# Patient Record
Sex: FEMALE | Race: WHITE | NOT HISPANIC OR LATINO | Employment: FULL TIME | URBAN - METROPOLITAN AREA
[De-identification: names, ages, dates, MRNs, and addresses within clinical notes are randomized per-mention and may not be internally consistent; named-entity substitution may affect disease eponyms.]

---

## 2017-01-06 ENCOUNTER — TELEPHONE (OUTPATIENT)
Dept: MEDICAL GROUP | Age: 37
End: 2017-01-06

## 2017-01-06 ENCOUNTER — PATIENT MESSAGE (OUTPATIENT)
Dept: MEDICAL GROUP | Age: 37
End: 2017-01-06

## 2017-01-06 ENCOUNTER — OFFICE VISIT (OUTPATIENT)
Dept: MEDICAL GROUP | Age: 37
End: 2017-01-06
Payer: COMMERCIAL

## 2017-01-06 VITALS
DIASTOLIC BLOOD PRESSURE: 76 MMHG | HEIGHT: 62 IN | OXYGEN SATURATION: 97 % | TEMPERATURE: 98.8 F | BODY MASS INDEX: 26.68 KG/M2 | WEIGHT: 145 LBS | HEART RATE: 95 BPM | SYSTOLIC BLOOD PRESSURE: 124 MMHG

## 2017-01-06 DIAGNOSIS — N63.0 BREAST MASS IN FEMALE: ICD-10-CM

## 2017-01-06 PROCEDURE — 99214 OFFICE O/P EST MOD 30 MIN: CPT | Performed by: FAMILY MEDICINE

## 2017-01-06 NOTE — TELEPHONE ENCOUNTER
FRANCISCA        1. Caller Name: Tiffany Johnson                                           Call Back Number: [phone] (home) [phone] (work) ext [phone]       2. Patient‘s symptoms (location & severity)?  Found lump in left breast    3. Is this a new symptom? yes    4. When did it start? 01/06/2017    5. Action taken per active symptom guide: scheduled appt (Same day scheduled; UC recommended; ED recommended)    6. Patient agrees to recommended action per active symptom guide: yes   scheduled appt .If no, patient was advised again of recommendation and patient verbalized understanding.

## 2017-01-07 PROBLEM — N63.0 BREAST MASS IN FEMALE: Status: ACTIVE | Noted: 2017-01-07

## 2017-01-07 NOTE — PROGRESS NOTES
Subjective:   CC: breast mass    HPI:     Tiffany Milligan is a 36 y.o. Female who present with a lump in her left breast  Family hx significant for mom with metastatic breast cancer, diagnosed at the age of 60 year old, currently going through treatment, BRCA1/2 negative.   Pt states that her left breast becomes mildly pruritic couple days ago. She scratches and finds a firm lump near the left nipple.   Denies fever, chills, nipple bleeding/discharge, lumps/bumps in the axilla or supraclavicular area, unexplained weight loss, breast skin changes, breast erythema or edema.   She, however, states that left nipple appears to be slightly inverted compared to the right nipple.   Pt is , currently not sexually active.     Current medicines (including changes today)  Current Outpatient Prescriptions   Medication Sig Dispense Refill   • ipratropium (ATROVENT) 0.02 % Solution by Nebulization route.     • omeprazole (PRILOSEC) 20 MG delayed-release capsule Take 20 mg by mouth every day.     • albuterol (PROAIR HFA) 108 (90 BASE) MCG/ACT Aero Soln inhalation aerosol Inhale 1-2 Puffs by mouth every 6 hours as needed for Shortness of Breath. 8.5 g 3     No current facility-administered medications for this visit.     She  has no past medical history on file.    I personally reviewed patient's problem list, allergies, medications, family hx, social hx with patient and update EPIC.     REVIEW OF SYSTEMS:  CONSTITUTIONAL:  Denies night sweats, fatigue, malaise, lethargy, fever or chills.  RESPIRATORY:  Denies cough, wheeze, hemoptysis, or shortness of breath.  CARDIOVASCULAR:  Denies chest pains, palpitations, pedal edema  GASTROINTESTINAL:  Denies abdominal pain, nausea or vomiting, diarrhea, constipation, hematemesis, hematochezia, melena.  GENITOURINARY:  Denies urinary urgency, frequency, dysuria, or hematuria.  No obstructive symptoms.  Denies unusual discharge.       Objective:     Blood pressure 124/76, pulse 95,  "temperature 37.1 °C (98.8 °F), height 1.575 m (5' 2.01\"), weight 65.772 kg (145 lb), last menstrual period 12/23/2016, SpO2 97 %, not currently breastfeeding. Body mass index is 26.51 kg/(m^2).    Physical Exam:  Constitutional: awake, alert, in no distress.  Skin: Warm, dry, good turgor, no rashes, bruises, ulcers in visible areas.  Eye: conjunctiva clear, lids neg for edema or lesions.  Neck: Trachea midline, no masses, no thyromegaly. No cervical or supraclavicular lymphadenopathy  Respiratory: Unlabored respiratory effort, lungs clear to auscultation, no wheezes, no ronchi.  Cardiovascular: Normal S1, S2, no murmur, no pedal edema.  Breast exam: breasts are symmetric, neg for erythema, edema, nipple bleeding/discharge.   There is an irregular, firm, mobile, non-tender mass measuring 4x7 cm, found on the left upper/lower outer quadrants approximately 1-2 cm from the left nipple. Left nipple is slightly inverted.   Neuro: CN2-12 grossly intact, no sensory deficits.   Psych: Oriented x3, affect and mood wnl, intact judgement and insight.       Assessment and Plan:   The following treatment plan was discussed    1. Breast mass in female, left  37 yo female with familial hx of metastatic breast cancer in first degree family member (mom) presents with a large left breast mass.   Low suspicion for infection or inflammation process per hx. Benign/malignant neoplasm is of concern. Plan:   - US-BREAST COMPLETE-LEFT; Future  - MA-DIAGNOSTIC MAMMO W/CAD-BILAT; Future  - REFERRAL TO INTAKE ONCOLOGY COORDINATOR      Yuridia Zelaya M.D.      Followup: Return in about 4 weeks (around 2/3/2017).           "

## 2017-01-17 ENCOUNTER — OFFICE VISIT (OUTPATIENT)
Dept: HEMATOLOGY ONCOLOGY | Facility: MEDICAL CENTER | Age: 37
End: 2017-01-17
Payer: COMMERCIAL

## 2017-01-17 VITALS
HEIGHT: 62 IN | SYSTOLIC BLOOD PRESSURE: 122 MMHG | BODY MASS INDEX: 26.5 KG/M2 | TEMPERATURE: 98.1 F | WEIGHT: 144 LBS | DIASTOLIC BLOOD PRESSURE: 84 MMHG | HEART RATE: 85 BPM | OXYGEN SATURATION: 98 % | RESPIRATION RATE: 16 BRPM

## 2017-01-17 DIAGNOSIS — N63.0 BREAST MASS IN FEMALE: ICD-10-CM

## 2017-01-17 PROCEDURE — 99203 OFFICE O/P NEW LOW 30 MIN: CPT | Performed by: NURSE PRACTITIONER

## 2017-01-17 RX ORDER — CETIRIZINE HYDROCHLORIDE 10 MG/1
10 TABLET ORAL DAILY
COMMUNITY

## 2017-01-17 ASSESSMENT — ENCOUNTER SYMPTOMS
NAUSEA: 0
HEARTBURN: 1
DIZZINESS: 0
HEADACHES: 0
MYALGIAS: 0
BACK PAIN: 0
WHEEZING: 0
WEAKNESS: 0
INSOMNIA: 0
NECK PAIN: 0
CONSTIPATION: 0
SHORTNESS OF BREATH: 0
PALPITATIONS: 0
CHILLS: 0
COUGH: 0
FEVER: 0
VOMITING: 0
TINGLING: 0
DIARRHEA: 0
WEIGHT LOSS: 0

## 2017-01-17 NOTE — MR AVS SNAPSHOT
"        Tiffany Milligan   2017 9:30 AM   Office Visit   MRN: [id number]    Department:  [facility]   Dept Phone:  [phone]    Description:  Female : 1980   Provider:  Haely Sweet A.PNateN.           Reason for Visit     New Patient breast mass in female       Allergies as of 2017     Allergen Noted Reactions    Ranitidine 2016;abd pain      Vital Signs     Blood Pressure Pulse Temperature Respirations Height Weight    122/84 mmHg 85 36.7 °C (98.1 °F) 16 1.575 m (5' 2.01\") 65.318 kg (144 lb)    Body Mass Index Oxygen Saturation Last Menstrual Period Breastfeeding? Smoking Status       26.33 kg/m2 98% 2016 No Never Smoker        Basic Information     Date Of Birth Sex Race Ethnicity Preferred Language    1980 Female Unable to Obtain Unknown English      Your appointments     2017  8:00 AM   MA DX30 with S RAJARRAN MG 1   [facility] ([place])    [address]  [place] [address]   [phone]              Problem List              ICD-10-CM Priority Class Noted - Resolved    Family history of malignant neoplasm of breast in first degree relative Z80.3   2016 - Present    Gastroesophageal reflux disease K21.9   2004 - Present    History of cholecystectomy Z90.49   2013 - Present    Reactive airway disease J45.909   2008 - Present    Vocal cord dysfunction J38.3   2013 - Present    Ulcerative proctitis without complication ([facility]) K51.20   2016 - Present    Breast mass in female, left N63   2017 - Present      Health Maintenance        Date Due Completion Dates    IMM DTaP/Tdap/Td Vaccine (1 - Tdap) 2/10/1999 ---    PAP SMEAR 2/10/2001 ---    IMM INFLUENZA (1) 2016 ---            Current Immunizations     No immunizations on file.      Below and/or attached are the medications your provider expects you to take. Review all of your home medications and " newly ordered medications with your provider and/or pharmacist. Follow medication instructions as directed by your provider and/or pharmacist. Please keep your medication list with you and share with your provider. Update the information when medications are discontinued, doses are changed, or new medications (including over-the-counter products) are added; and carry medication information at all times in the event of emergency situations     Allergies:  RANITIDINE - (reactions not documented)               Medications  Valid as of: January 17, 2017 - 10:24 AM    Generic Name Brand Name Tablet Size Instructions for use    Albuterol Sulfate (Aero Soln) albuterol 108 (90 BASE) MCG/ACT Inhale 1-2 Puffs by mouth every 6 hours as needed for Shortness of Breath.        Cetirizine HCl (Tab) ZYRTEC 10 MG Take 10 mg by mouth every day.        Ipratropium Bromide (Solution) ATROVENT 0.02 % by Nebulization route.        Omeprazole (CAPSULE DELAYED RELEASE) PRILOSEC 20 MG Take 20 mg by mouth every day.        .                 Medicines prescribed today were sent to:     [id number] - [place] - [address]    [address] [place] [address]    Phone: [phone] Fax: [phone]    Open 24 Hours?: No      Medication refill instructions:       If your prescription bottle indicates you have medication refills left, it is not necessary to call your provider’s office. Please contact your pharmacy and they will refill your medication.    If your prescription bottle indicates you do not have any refills left, you may request refills at any time through one of the following ways: The online [employer] Search system (except Urgent Care), by calling your provider’s office, or by asking your pharmacy to contact your provider’s office with a refill request. Medication refills are processed only during regular business hours and may not be available until the next business day. Your provider may request additional information or  to have a follow-up visit with you prior to refilling your medication.   *Please Note: Medication refills are assigned a new Rx number when refilled electronically. Your pharmacy may indicate that no refills were authorized even though a new prescription for the same medication is available at the pharmacy. Please request the medicine by name with the pharmacy before contacting your provider for a refill.           [employer] Access Code: Activation code not generated  Current Invenshure Status: Active

## 2017-01-17 NOTE — PROGRESS NOTES
Subjective:      Tiffany Milligan is a 36 y.o. female who presents as a New Patient with a breast mass.         HPI    Patient referred to me, Intake Oncology Coordinator by her PCP Dr. Zelaya for left breast mass.  Patient is accompanied by herself for today's visit.    Approximately 2 weeks ago patient noticed some tenderness in her left breast and was repositioning her bra when she palpated a mass. She immediately was seen by her primary care provider who completed a breast exam and noted to have a mass in the left breast. She has been scheduled for a diagnostic mammogram which is scheduled for . Patient stated she has noticed some tenderness to palpation but denies pain otherwise. Being 36 years old patient has not had a mammogram in the past. Patient stated she does do monthly self breast exams, but this was the first time she noticed this mass in the left breast.    Obstetrics:   Age of first birth: N/A  Breast feed: N/A  Menarche age: 13  Birth control taken: 10  Menopause age: N/A  HRT: N/A    Patient does have a family history of breast cancer in her mother. Her mother was diagnosed at the age of 40 initially. According to the patient her mother underwent full body radiation when she was 2 years old, and it was thought that was the cause of her breast cancer diagnosis at the age of 40. Approximately 4-5 years ago her mother was diagnosed with metastatic breast cancer and is currently receiving infusions for her bones as well as taking a daily pill. According to the patient her mother was genetically tested and is BRCA1/2 negative. Patient denies any other family history of cancer.    Clinically patient is very healthy with no significant medical issues. She does have heartburn which is controlled with medication. She is also been diagnosed with colitis and has a history of asthma and gout. Surgical history includes a cholecystectomy.    Patient is a never smoker and drinks alcohol  rarely.    Allergies   Allergen Reactions   • Ranitidine      2003-04-21;abd pain     Current Outpatient Prescriptions on File Prior to Visit   Medication Sig Dispense Refill   • omeprazole (PRILOSEC) 20 MG delayed-release capsule Take 20 mg by mouth every day.     • ipratropium (ATROVENT) 0.02 % Solution by Nebulization route.     • albuterol (PROAIR HFA) 108 (90 BASE) MCG/ACT Aero Soln inhalation aerosol Inhale 1-2 Puffs by mouth every 6 hours as needed for Shortness of Breath. 8.5 g 3     No current facility-administered medications on file prior to visit.     Past Medical History   Diagnosis Date   • Heart burn    • Asthma    • Gout    • Colitis      Past Surgical History   Procedure Laterality Date   • Cholecystectomy  2009     Family History   Problem Relation Age of Onset   • Cancer Mother 40     did have genetic testing, neg   • Hypertension Mother    • Hyperlipidemia Mother    • Hypertension Father    • Hyperlipidemia Father    • No Known Problems Sister    • No Known Problems Brother      Social History     Social History   • Marital Status: Single     Spouse Name: N/A   • Number of Children: N/A   • Years of Education: N/A     Social History Main Topics   • Smoking status: Never Smoker    • Smokeless tobacco: Never Used   • Alcohol Use: 0.6 oz/week     1 Glasses of wine per week   • Drug Use: No   • Sexual Activity:     Partners: Male     Birth Control/ Protection: Pill     Other Topics Concern   • None     Social History Narrative    Recently move to [place] from [place].     Currently employed, works at home.        Review of Systems   Constitutional: Negative for fever, chills, weight loss and malaise/fatigue.   Respiratory: Negative for cough, shortness of breath and wheezing.    Cardiovascular: Negative for chest pain, palpitations and leg swelling.   Gastrointestinal: Positive for heartburn (controlled with medications). Negative for nausea, vomiting, diarrhea and constipation.   Genitourinary: Negative for  "dysuria.   Musculoskeletal: Negative for myalgias, back pain, joint pain and neck pain.   Skin: Negative for itching and rash.   Neurological: Negative for dizziness, tingling, weakness and headaches.   Psychiatric/Behavioral: The patient does not have insomnia.           Objective:     /84 mmHg  Pulse 85  Temp(Src) 36.7 °C (98.1 °F)  Resp 16  Ht 1.575 m (5' 2.01\")  Wt 65.318 kg (144 lb)  BMI 26.33 kg/m2  SpO2 98%  LMP 12/23/2016  Breastfeeding? No     Physical Exam   Constitutional: She is oriented to person, place, and time. She appears well-developed and well-nourished. No distress.   HENT:   Head: Normocephalic and atraumatic.   Mouth/Throat: Oropharynx is clear and moist. No oropharyngeal exudate.   Eyes: Conjunctivae and EOM are normal. Pupils are equal, round, and reactive to light. Right eye exhibits no discharge. Left eye exhibits no discharge. No scleral icterus.   Neck: Normal range of motion. Neck supple. No thyromegaly present.   Cardiovascular: Normal rate, regular rhythm, normal heart sounds and intact distal pulses.  Exam reveals no gallop and no friction rub.    No murmur heard.  Pulmonary/Chest: Effort normal and breath sounds normal. No respiratory distress. She has no wheezes. Right breast exhibits no inverted nipple, no mass, no nipple discharge, no skin change and no tenderness. Left breast exhibits mass and tenderness. Left breast exhibits no inverted nipple, no nipple discharge and no skin change. Breasts are symmetrical. There is no breast swelling.       On clinical exam there is a hard mass noted throughout the left nipple area. It does appear that there are irregular borders and there is some mild tenderness with palpation. No axillary adenopathy noted bilaterally.   Abdominal: Soft. Bowel sounds are normal. She exhibits no distension. There is no tenderness.   Genitourinary: There is breast tenderness (left breast mass). No breast discharge or bleeding.   Musculoskeletal: " Normal range of motion. She exhibits no edema or tenderness.   Lymphadenopathy:     She has no cervical adenopathy.   Neurological: She is alert and oriented to person, place, and time.   Skin: Skin is warm and dry. No rash noted. She is not diaphoretic. No erythema. No pallor.   Psychiatric: She has a normal mood and affect. Her behavior is normal.   Vitals reviewed.              Assessment/Plan:     1. Breast mass in female, left       Plan  1. On clinical exam there is suspicious findings for possible malignancy of the left breast. She has been scheduled for a diagnostic mammogram which is scheduled on Thursday, January 19. I will follow up with the patient after her mammogram is completed to discuss further workup if needed. I did explain to patient that there is a possibility that she will require a biopsy. If a biopsy is recommended I will be glad to order the biopsy. A biopsy is needed patient is to follow up with me after the biopsy is completed to review the results. I discussed in detail with patient the plan at this time and she has verbalized understanding and is in agreement with the plan.      Spent 40 minutes in direct, face-to-face patient contact in which greater than 50% of the visit was spent counseling and coordinating of care.

## 2017-01-19 ENCOUNTER — TELEPHONE (OUTPATIENT)
Dept: HEMATOLOGY ONCOLOGY | Facility: MEDICAL CENTER | Age: 37
End: 2017-01-19

## 2017-01-19 NOTE — TELEPHONE ENCOUNTER
I called Left message for patient regarding her MA-MAMMO DIAGNOSTIC BILAT W/ABHILASH W/CAD. That is scheduled on 02/03/17 we would like for her to get it done sooner. please advices me ( mayo SOFIA) when she calls back

## 2017-01-20 ENCOUNTER — TELEPHONE (OUTPATIENT)
Dept: HEMATOLOGY ONCOLOGY | Facility: MEDICAL CENTER | Age: 37
End: 2017-01-20

## 2017-01-20 DIAGNOSIS — N63.0 BREAST MASS IN FEMALE: ICD-10-CM

## 2017-01-20 NOTE — TELEPHONE ENCOUNTER
Patient was scheduled for a diagnostic mammogram that was to be completed on Thursday, January 19. Patient realized there was an issue with her insurance and she spoke to her human resource department through her company and plan to be reinstated with insurance company fairly shortly. Her bilateral mammogram has been rescheduled for February 3. I spoke to the patient and will attempt to get her in sooner along with an ultrasound of the left breast due to a high suspicion for possible breast cancer. We will attempt to get patient rescheduled for both the diagnostic mammogram and ultrasound of the left breast. I will also in anticipation for her possibly needing a biopsy will schedule that as well. If imaging shows no concern for malignancy then we can cancel the biopsy appointment. I have spoken with nurse navigator as well for assistance in getting patient scheduled an expedited workup. Patient is aware and agreement with the plan. She believes she will have insurance and affect shortly.

## 2017-01-24 ENCOUNTER — DOCUMENTATION (OUTPATIENT)
Dept: OTHER | Facility: MEDICAL CENTER | Age: 37
End: 2017-01-24

## 2017-01-26 ENCOUNTER — TELEPHONE (OUTPATIENT)
Dept: MEDICAL GROUP | Age: 37
End: 2017-01-26

## 2017-01-26 ENCOUNTER — HOSPITAL ENCOUNTER (OUTPATIENT)
Dept: RADIOLOGY | Facility: MEDICAL CENTER | Age: 37
End: 2017-01-26
Attending: NURSE PRACTITIONER
Payer: COMMERCIAL

## 2017-01-26 ENCOUNTER — HOSPITAL ENCOUNTER (OUTPATIENT)
Dept: RADIOLOGY | Facility: MEDICAL CENTER | Age: 37
End: 2017-01-26
Attending: FAMILY MEDICINE
Payer: COMMERCIAL

## 2017-01-26 DIAGNOSIS — N63.0 BREAST MASS IN FEMALE: ICD-10-CM

## 2017-01-26 PROCEDURE — G0204 DX MAMMO INCL CAD BI: HCPCS

## 2017-01-26 PROCEDURE — 76641 ULTRASOUND BREAST COMPLETE: CPT | Mod: LT

## 2017-01-27 NOTE — TELEPHONE ENCOUNTER
----- Message from Maribel JUAN Bhaskar sent at 1/26/2017  2:34 PM PST -----  I picked up a call at the  from that was Dr Dewitt from [facility]. She immediatly began to result the patients mammogram. Before I was able to interrupt Dr Dewitt and get an MA, she stated that all of the information she was giving me was going to be in her report, that she was about ready to sign, and hung up.

## 2017-01-30 DIAGNOSIS — Z80.3 FAMILY HISTORY OF MALIGNANT NEOPLASM OF BREAST IN FIRST DEGREE RELATIVE: ICD-10-CM

## 2017-01-30 DIAGNOSIS — N63.0 BREAST MASS IN FEMALE: ICD-10-CM

## 2017-02-01 ENCOUNTER — TELEPHONE (OUTPATIENT)
Dept: RADIOLOGY | Facility: MEDICAL CENTER | Age: 37
End: 2017-02-01

## 2017-02-02 ENCOUNTER — HOSPITAL ENCOUNTER (OUTPATIENT)
Dept: RADIOLOGY | Facility: MEDICAL CENTER | Age: 37
End: 2017-02-02
Attending: FAMILY MEDICINE
Payer: COMMERCIAL

## 2017-02-02 ENCOUNTER — HOSPITAL ENCOUNTER (OUTPATIENT)
Facility: MEDICAL CENTER | Age: 37
End: 2017-02-02
Attending: RADIOLOGY
Payer: COMMERCIAL

## 2017-02-02 DIAGNOSIS — N60.02 CYST OF LEFT BREAST: ICD-10-CM

## 2017-02-02 PROCEDURE — 88112 CYTOPATH CELL ENHANCE TECH: CPT

## 2017-02-02 PROCEDURE — 88305 TISSUE EXAM BY PATHOLOGIST: CPT | Mod: 59

## 2017-02-02 PROCEDURE — 19000 PUNCTURE ASPIR CYST BREAST: CPT | Mod: LT

## 2017-02-03 ENCOUNTER — TELEPHONE (OUTPATIENT)
Dept: HEMATOLOGY ONCOLOGY | Facility: MEDICAL CENTER | Age: 37
End: 2017-02-03

## 2017-02-04 NOTE — TELEPHONE ENCOUNTER
Patient had a mammogram and ultrasound that showed large cysts. She did end up having a biopsy and drainage of the cyst completed yesterday. I did review the pathology which showed that she did not have any evidence of malignancy. She did have 2 cysts that were drained and both were negative. I did discuss the results with the patient over the phone today as well. Patient stated she is feeling a little bit sore but doing well. She was told that they drained 33 cc of fluid from one of the cysts. I did confirm with the patient that her mother was diagnosed breast cancer in her early 40s. It is thought that the mother's breast cancer was due to whole body radiation that she experienced when she was a young child. Her mother did have metastatic disease diagnosed in her 60s, approximately 5 years ago and her mother did undergo BRCA testing. Her mother was negative for the BRCA gene. I did question our  who stated it would be appropriate for the mother to undergo a full panel, which is likely not available when she was tested 5 years ago. I did recommend to the patient that once her mother establish care (as she just moved to [place]) she get a referral to a  for further testing. At the age of 36, patient with cysts and no concern for malignancy, she should resume normal screening for breast cancer. Patient was appreciative of the phone call and did acknowledge the recommendation for her mother to be seen by genetics.    There is no further follow-up with the C needed.

## 2017-03-23 ENCOUNTER — OFFICE VISIT (OUTPATIENT)
Dept: MEDICAL GROUP | Age: 37
End: 2017-03-23
Payer: COMMERCIAL

## 2017-03-23 VITALS
WEIGHT: 142 LBS | SYSTOLIC BLOOD PRESSURE: 110 MMHG | TEMPERATURE: 99 F | OXYGEN SATURATION: 99 % | BODY MASS INDEX: 26.13 KG/M2 | HEART RATE: 83 BPM | DIASTOLIC BLOOD PRESSURE: 70 MMHG | HEIGHT: 62 IN

## 2017-03-23 DIAGNOSIS — L98.9 SKIN LESION: ICD-10-CM

## 2017-03-23 DIAGNOSIS — L72.3 SEBACEOUS CYST: ICD-10-CM

## 2017-03-23 DIAGNOSIS — M10.071 ACUTE IDIOPATHIC GOUT INVOLVING TOE OF RIGHT FOOT: Primary | ICD-10-CM

## 2017-03-23 DIAGNOSIS — Z30.011 ENCOUNTER FOR INITIAL PRESCRIPTION OF CONTRACEPTIVE PILLS: ICD-10-CM

## 2017-03-23 DIAGNOSIS — N93.9 VAGINAL SPOTTING: ICD-10-CM

## 2017-03-23 PROCEDURE — 99214 OFFICE O/P EST MOD 30 MIN: CPT | Performed by: FAMILY MEDICINE

## 2017-03-23 RX ORDER — LEVONORGESTREL / ETHINYL ESTRADIOL AND ETHINYL ESTRADIOL 150-30(84)
KIT ORAL
Qty: 91 TAB | Refills: 3 | Status: SHIPPED | OUTPATIENT
Start: 2017-03-23 | End: 2018-02-13 | Stop reason: SDUPTHER

## 2017-03-24 NOTE — PROGRESS NOTES
Subjective:   CC: gout    HPI:     Tiffany Milligan is a 37 y.o. female, established patient of the clinic, presents with the following concerns:     1. Acute idiopathic gout involving toe of right foot  Pt went to urgent care couple days ago and was diagnosed with gouty arthritis of the right 1st MTP.   She was prescribed a course of prednisone, which appears to help with her symptoms.   Today, she states that her symptoms have resolved. She wants to discuss management.   This is the first episode of gout.   Her father has gout as well.    2. Sebaceous cyst, scalp  C/o two small bumps on her scalp, which have been present for quite some time.   The lesions recent enlarged leading to discomfort with hair brushing.   She otherwise denies pain, discharge.     3. Skin lesion  Pt c/o a small growth on the left lower eyelid. Again, this lesion has been there for years, does not change in size/shape/color.   Denies hx of familial or personal skin malignancy.    4. Vaginal spotting after initiation of OCPs  C/o acute mild vaginal spotting for 3 weeks. Pt states that she started taking  birth control pills prescribed to her in [place] approximately 6-7 weeks ago because she starts to date again.   She and her new boyfriend has not had sex. So, she is quite sure that she is not pregnant. Denies abnormal vaginal discharge, pelvic pain, hx of STD, genital rash, dysuria, urinary frequency/urgency, morning nausea, vomiting, breast fullness.     Current medicines (including changes today)  Current Outpatient Prescriptions   Medication Sig Dispense Refill   • Levonorgest-Eth Estrad 91-Day (SEASONIQUE) 0.15-0.03 &0.01 MG Tab Taking one tablet daily. 91 Tab 3   • cetirizine (ZYRTEC) 10 MG Tab Take 10 mg by mouth every day.     • omeprazole (PRILOSEC) 20 MG delayed-release capsule Take 20 mg by mouth every day.     • albuterol (PROAIR HFA) 108 (90 BASE) MCG/ACT Aero Soln inhalation aerosol Inhale 1-2 Puffs by mouth every 6 hours as  "needed for Shortness of Breath. 8.5 g 3     No current facility-administered medications for this visit.     She  has a past medical history of Heart burn; Asthma; Gout; and Colitis.    I personally reviewed patient's problem list, allergies, medications, family hx, social hx with patient and update EPIC.     REVIEW OF SYSTEMS:  CONSTITUTIONAL:  Denies night sweats, fatigue, malaise, lethargy, fever or chills.  RESPIRATORY:  Denies cough, wheeze, hemoptysis, or shortness of breath.  CARDIOVASCULAR:  Denies chest pains, palpitations, pedal edema  GASTROINTESTINAL:  Denies abdominal pain, nausea or vomiting, diarrhea, constipation, hematemesis, hematochezia, melena.  GENITOURINARY:  Denies urinary urgency, frequency, dysuria, or hematuria.        Objective:     Blood pressure 110/70, pulse 83, temperature 37.2 °C (99 °F), height 1.575 m (5' 2.01\"), weight 64.411 kg (142 lb), last menstrual period 03/07/2017, SpO2 99 %, not currently breastfeeding. Body mass index is 25.97 kg/(m^2).    Physical Exam:  Constitutional: awake, alert, in no distress.  Skin: Warm, dry, good turgor, no rashes, bruises, ulcers in visible areas.  - 1x1.5 cm, firm, non-mobile, non-tender, non-bleeding nodules on the right and left parietal scalp   - 0.2 cm, soft, mobile, skin-colored nodule found on left lower eyelid  Eye: PERRL, conjunctiva clear, lids neg for edema or lesions.  ENMT: TM and auditory canals wnl. Oral and nasal mucosa wnl. Lips without lesions, good dentition, oropharynx clear.  Neck: Trachea midline, no masses, no thyromegaly. No cervical or supraclavicular lymphadenopathy  Respiratory: Unlabored respiratory effort, lungs clear to auscultation, no wheezes, no rhonchi.  Cardiovascular: Normal S1, S2, no murmur, no pedal edema.  Abdomen: Soft, non-tender to palpation, no hernia, no hepatosplenomegaly.  Neuro: CN2-12 grossly intact. Strength 5/5, reflexes 2/4 in all extremities, no sensory deficits.   Psych: Oriented x3, affect " and mood wnl, intact judgement and insight.       Assessment and Plan:   The following treatment plan was discussed    1. Acute idiopathic gout involving toe of right foot  Pt experiences first episode of gout on the right MTP couple days ago, successfully treated with Prednisone.   Will order uric acid, but will not start treatment unless pt suffers recurrent of gout flares. Plan:   - URIC ACID; Future  - discussed dietary modification to prevent future attacks  - alcohol avoidance    2. Sebaceous cyst, scalp  - REFERRAL TO DERMATOLOGY    3. Skin lesion on left lower eyelid  Likely benign neoplasm, will refer to derm for excision.   - REFERRAL TO DERMATOLOGY    4. Vaginal spotting.   Likely secondary to recent initiation of OCP. Advised pt to monitor symptoms. Return to clinic if symptoms do not improve in couple weeks.     5. Initiation of OCP:   - Levonorgest-Eth Estrad 91-Day (SEASONIQUE) 0.15-0.03 &0.01 MG Tab; Taking one tablet daily.  Dispense: 91 Tab; Refill: 3      Yuridia Zelaya M.D.      Followup: Return if symptoms worsen or fail to improve.    Please note that this dictation was created using voice recognition software. I have made every reasonable attempt to correct obvious errors, but I expect that there are errors of grammar and possibly content that I did not discover before finalizing the note.

## 2017-06-13 ENCOUNTER — RX ONLY (OUTPATIENT)
Age: 37
Setting detail: RX ONLY
End: 2017-06-13

## 2017-06-13 PROBLEM — D22.12 MELANOCYTIC NEVI OF LEFT EYELID, INCLUDING CANTHUS: Status: ACTIVE | Noted: 2017-06-13

## 2017-11-18 ENCOUNTER — HOSPITAL ENCOUNTER (OUTPATIENT)
Facility: MEDICAL CENTER | Age: 37
End: 2017-11-18
Attending: PHYSICIAN ASSISTANT
Payer: COMMERCIAL

## 2017-11-18 ENCOUNTER — OFFICE VISIT (OUTPATIENT)
Dept: URGENT CARE | Facility: PHYSICIAN GROUP | Age: 37
End: 2017-11-18
Payer: COMMERCIAL

## 2017-11-18 VITALS
DIASTOLIC BLOOD PRESSURE: 80 MMHG | OXYGEN SATURATION: 96 % | TEMPERATURE: 99.5 F | BODY MASS INDEX: 26.68 KG/M2 | HEIGHT: 62 IN | SYSTOLIC BLOOD PRESSURE: 124 MMHG | HEART RATE: 91 BPM | WEIGHT: 145 LBS | RESPIRATION RATE: 14 BRPM

## 2017-11-18 DIAGNOSIS — N30.01 ACUTE CYSTITIS WITH HEMATURIA: ICD-10-CM

## 2017-11-18 LAB
APPEARANCE UR: CLEAR
BILIRUB UR STRIP-MCNC: NORMAL MG/DL
COLOR UR AUTO: YELLOW
GLUCOSE UR STRIP.AUTO-MCNC: NORMAL MG/DL
KETONES UR STRIP.AUTO-MCNC: NORMAL MG/DL
LEUKOCYTE ESTERASE UR QL STRIP.AUTO: NORMAL
NITRITE UR QL STRIP.AUTO: NORMAL
PH UR STRIP.AUTO: 6 [PH] (ref 5–8)
PROT UR QL STRIP: NORMAL MG/DL
RBC UR QL AUTO: NORMAL
SP GR UR STRIP.AUTO: 1.01
UROBILINOGEN UR STRIP-MCNC: NORMAL MG/DL

## 2017-11-18 PROCEDURE — 87086 URINE CULTURE/COLONY COUNT: CPT

## 2017-11-18 PROCEDURE — 81002 URINALYSIS NONAUTO W/O SCOPE: CPT | Performed by: PHYSICIAN ASSISTANT

## 2017-11-18 PROCEDURE — 99213 OFFICE O/P EST LOW 20 MIN: CPT | Performed by: PHYSICIAN ASSISTANT

## 2017-11-18 RX ORDER — SULFAMETHOXAZOLE AND TRIMETHOPRIM 800; 160 MG/1; MG/1
1 TABLET ORAL 2 TIMES DAILY
Qty: 10 TAB | Refills: 0 | Status: SHIPPED | OUTPATIENT
Start: 2017-11-18 | End: 2017-11-23

## 2017-11-18 NOTE — PROGRESS NOTES
Chief Complaint   Patient presents with   • Blood in Urine     urinary urgency, back pain, blood in urine onset suddenly this AM       HISTORY OF PRESENT ILLNESS: Patient is a 37 y.o. female who presents today for 12 hours of blood in the urine.   Patient states she has had mild bilateral aching lower back as well as some urinary urgency and suprapubic pressure. She has not had any increased frequency overall and does not burn when she urinates.  She has no history of blood in her urine and really has not had UTIs.  She has no personal history of kidney stones but notes her father does and believes her mother has had one.  No attempted interventions.     Patient Active Problem List    Diagnosis Date Noted   • Acute idiopathic gout involving toe of right foot 03/23/2017   • Sebaceous cyst, scalp 03/23/2017   • Skin lesion 03/23/2017   • Encounter for initial prescription of contraceptive pills 03/23/2017   • Breast mass in female, left 01/07/2017   • Ulcerative proctitis without complication (CMS-HCC) 12/23/2016   • Family history of malignant neoplasm of breast in first degree relative 01/12/2016   • History of cholecystectomy 01/09/2013   • Vocal cord dysfunction 01/09/2013   • Reactive airway disease 05/23/2008   • Gastroesophageal reflux disease 04/30/2004       Allergies:Ranitidine    Current Outpatient Prescriptions Ordered in [facility]   Medication Sig Dispense Refill   • Levonorgest-Eth Estrad 91-Day (SEASONIQUE) 0.15-0.03 &0.01 MG Tab Taking one tablet daily. 91 Tab 3   • cetirizine (ZYRTEC) 10 MG Tab Take 10 mg by mouth every day.     • omeprazole (PRILOSEC) 20 MG delayed-release capsule Take 20 mg by mouth every day.     • albuterol (PROAIR HFA) 108 (90 BASE) MCG/ACT Aero Soln inhalation aerosol Inhale 1-2 Puffs by mouth every 6 hours as needed for Shortness of Breath. 8.5 g 3     No current [facility]-ordered facility-administered medications on file.        Past Medical History:   Diagnosis Date   • Asthma    •  "Colitis    • Gout    • Heart burn        Social History   Substance Use Topics   • Smoking status: Never Smoker   • Smokeless tobacco: Never Used   • Alcohol use 0.6 oz/week     1 Glasses of wine per week       Family Status   Relation Status   • Mother Alive   • Father Alive   • Sister Alive   • Brother Alive   • Maternal Grandmother    • Maternal Grandfather    • Paternal Grandmother    • Paternal Grandfather      Family History   Problem Relation Age of Onset   • Cancer Mother 40     did have genetic testing, neg   • Hypertension Mother    • Hyperlipidemia Mother    • Hypertension Father    • Hyperlipidemia Father    • No Known Problems Sister    • No Known Problems Brother        ROS:  Review of Systems   Constitutional: Negative for fever, chills, weight loss and malaise/fatigue.   HENT: Negative for ear pain, nosebleeds, congestion, sore throat and neck pain.    Eyes: Negative for blurred vision.   Respiratory: Negative for cough, sputum production, shortness of breath and wheezing.    Cardiovascular: Negative for chest pain, palpitations, orthopnea and leg swelling.   Gastrointestinal: SEE HPI  Genitourinary: SEE HPI    Exam:  Blood pressure 124/80, pulse 91, temperature 37.5 °C (99.5 °F), resp. rate 14, height 1.575 m (5' 2\"), weight 65.8 kg (145 lb), SpO2 96 %.  General:  Well nourished, well developed female in NAD  Eyes: PERRLA, EOM within normal limits, no conjunctival injection, no scleral icterus, visual fields and acuity grossly intact.  Ears: Normal shape and symmetry, no tenderness, no discharge. External canals are without any significant edema or erythema. Tympanic membranes are without any inflammation, no effusion. Gross auditory acuity is intact  Nose: Symmetrical, sinuses without tenderness, no discharge.   Mouth: reasonable hygiene, no erythema exudates or tonsillar enlargement.  Neck: no masses, range of motion within normal limits, no tracheal deviation. No " lymphadenopathy  Pulmonary: Normal respiratory effort, no wheezes, crackles, or rhonchi.  Cardiovascular: regular rate and rhythm without murmurs, rubs, or gallops.  Abdomen: Soft, mild suprapubic TTP, nondistended. Normal bowel sounds. No hepatosplenomegaly or masses, or hernias. No rebound or guarding.  No CVA tenderness.   Skin: No visible rashes or lesion. Warm, pink, dry.   Extremities: no clubbing, cyanosis, or edema.  Neuro: A&O x 3. Speech normal/clear.  Normal gait.     Component Results     Component Value Ref Range & Units Status   POC Color yellow Negative Final   POC Appearance clear Negative Final   POC Leukocyte Esterase sm 1+ Negative Final   POC Nitrites neg Negative Final   POC Urobiligen neg Negative (0.2) mg/dL Final   POC Protein neg Negative mg/dL Final   POC Urine PH 6.0 5.0 - 8.0 Final   POC Blood mod ++ Negative Final   POC Specific Gravity 1.010 <1.005 - >1.030 Final   POC Ketones neg Negative mg/dL Final   POC Biliruben neg Negative mg/dL Final   POC Glucose neg Negative mg/dL Final         Assessment/Plan:  1. Acute cystitis with hematuria  sulfamethoxazole-trimethoprim (BACTRIM DS) 800-160 MG tablet    URINE CULTURE(NEW)    POCT Urinalysis       -POCT U/A as above with small leuks and blood.   Culture sent  -Fluids emphasized.  Void before/after intercourse.  Recommend abstain until treatment complete/symptoms resolved.   -did advise patient that if any change in character of pain or discomfort to return for recheck. She declines any imaging for stones today.   -signs and symptoms of worsening/ascending infection discussed and to seek prompt medical care should they arise.        Supportive care, differential diagnoses, and indications for immediate follow-up discussed with patient.   Pathogenesis of diagnosis discussed including typical length and natural progression.   Instructed to return to clinic or nearest emergency department for any change in condition, further concerns, or  worsening of symptoms.  Patient states understanding of the plan of care and discharge instructions.      Claudia Thompson P.A.-C.

## 2017-11-20 LAB
BACTERIA UR CULT: NORMAL
SIGNIFICANT IND 70042: NORMAL
SOURCE SOURCE: NORMAL

## 2017-12-29 ENCOUNTER — HOSPITAL ENCOUNTER (OUTPATIENT)
Dept: RADIOLOGY | Facility: MEDICAL CENTER | Age: 37
End: 2017-12-29
Attending: EMERGENCY MEDICINE
Payer: COMMERCIAL

## 2017-12-29 ENCOUNTER — OFFICE VISIT (OUTPATIENT)
Dept: URGENT CARE | Facility: PHYSICIAN GROUP | Age: 37
End: 2017-12-29
Payer: COMMERCIAL

## 2017-12-29 VITALS
WEIGHT: 150 LBS | OXYGEN SATURATION: 97 % | RESPIRATION RATE: 18 BRPM | HEART RATE: 105 BPM | HEIGHT: 62 IN | BODY MASS INDEX: 27.6 KG/M2 | SYSTOLIC BLOOD PRESSURE: 124 MMHG | TEMPERATURE: 98.4 F | DIASTOLIC BLOOD PRESSURE: 76 MMHG

## 2017-12-29 DIAGNOSIS — R05.9 COUGH: ICD-10-CM

## 2017-12-29 PROCEDURE — 93000 ELECTROCARDIOGRAM COMPLETE: CPT | Performed by: EMERGENCY MEDICINE

## 2017-12-29 PROCEDURE — 71020 DX-CHEST-2 VIEWS: CPT

## 2017-12-29 PROCEDURE — 99203 OFFICE O/P NEW LOW 30 MIN: CPT | Performed by: EMERGENCY MEDICINE

## 2017-12-29 RX ORDER — BENZONATATE 100 MG/1
100 CAPSULE ORAL 3 TIMES DAILY PRN
Qty: 60 CAP | Refills: 0 | Status: SHIPPED | OUTPATIENT
Start: 2017-12-29 | End: 2019-03-11

## 2017-12-30 ENCOUNTER — TELEPHONE (OUTPATIENT)
Dept: URGENT CARE | Facility: PHYSICIAN GROUP | Age: 37
End: 2017-12-30

## 2017-12-30 ENCOUNTER — HOSPITAL ENCOUNTER (OUTPATIENT)
Dept: LAB | Facility: MEDICAL CENTER | Age: 37
End: 2017-12-30
Payer: COMMERCIAL

## 2017-12-30 DIAGNOSIS — R05.9 COUGH: ICD-10-CM

## 2017-12-30 LAB — DEPRECATED D DIMER PPP IA-ACNC: <200 NG/ML(D-DU)

## 2017-12-30 PROCEDURE — 36415 COLL VENOUS BLD VENIPUNCTURE: CPT

## 2017-12-30 PROCEDURE — 85379 FIBRIN DEGRADATION QUANT: CPT

## 2017-12-30 ASSESSMENT — ENCOUNTER SYMPTOMS
STRIDOR: 0
ABDOMINAL PAIN: 0
SHORTNESS OF BREATH: 1
EYE REDNESS: 0
EYE DISCHARGE: 0
HEMOPTYSIS: 0
PALPITATIONS: 0
SINUS PAIN: 1
HALLUCINATIONS: 0
SPUTUM PRODUCTION: 0
ORTHOPNEA: 0
EYE PAIN: 0
DIARRHEA: 0
SENSORY CHANGE: 0
SPEECH CHANGE: 0
COUGH: 1
FEVER: 0
CONSTIPATION: 0

## 2018-01-02 ENCOUNTER — PATIENT MESSAGE (OUTPATIENT)
Dept: MEDICAL GROUP | Age: 38
End: 2018-01-02

## 2018-02-13 DIAGNOSIS — Z30.011 ENCOUNTER FOR INITIAL PRESCRIPTION OF CONTRACEPTIVE PILLS: ICD-10-CM

## 2018-02-13 RX ORDER — LEVONORGESTREL AND ETHINYL ESTRADIOL 150-30(84)
KIT ORAL
Qty: 91 TAB | Refills: 0 | Status: SHIPPED | OUTPATIENT
Start: 2018-02-13 | End: 2018-05-01 | Stop reason: SDUPTHER

## 2018-04-05 ENCOUNTER — OFFICE VISIT (OUTPATIENT)
Dept: MEDICAL GROUP | Age: 38
End: 2018-04-05
Payer: COMMERCIAL

## 2018-04-05 VITALS
BODY MASS INDEX: 27.23 KG/M2 | HEIGHT: 62 IN | TEMPERATURE: 98.5 F | DIASTOLIC BLOOD PRESSURE: 74 MMHG | HEART RATE: 87 BPM | OXYGEN SATURATION: 96 % | WEIGHT: 148 LBS | SYSTOLIC BLOOD PRESSURE: 120 MMHG

## 2018-04-05 DIAGNOSIS — K51.20 ULCERATIVE PROCTITIS WITHOUT COMPLICATION (HCC): ICD-10-CM

## 2018-04-05 DIAGNOSIS — S39.012A LUMBOSACRAL STRAIN, INITIAL ENCOUNTER: Primary | ICD-10-CM

## 2018-04-05 DIAGNOSIS — L72.3 SEBACEOUS CYST: ICD-10-CM

## 2018-04-05 PROBLEM — L98.9 SKIN LESION: Status: RESOLVED | Noted: 2017-03-23 | Resolved: 2018-04-05

## 2018-04-05 PROCEDURE — 99214 OFFICE O/P EST MOD 30 MIN: CPT | Performed by: FAMILY MEDICINE

## 2018-04-05 RX ORDER — NAPROXEN 500 MG/1
500 TABLET ORAL 2 TIMES DAILY WITH MEALS
Qty: 30 TAB | Refills: 0 | Status: SHIPPED | OUTPATIENT
Start: 2018-04-05 | End: 2019-03-22

## 2018-04-05 RX ORDER — TIZANIDINE 4 MG/1
4 TABLET ORAL EVERY 6 HOURS PRN
Qty: 30 TAB | Refills: 0 | Status: SHIPPED | OUTPATIENT
Start: 2018-04-05 | End: 2019-03-22

## 2018-04-05 ASSESSMENT — PATIENT HEALTH QUESTIONNAIRE - PHQ9: CLINICAL INTERPRETATION OF PHQ2 SCORE: 0

## 2018-04-06 NOTE — PROGRESS NOTES
"Subjective:   CC: Acute right-sided back pain    HPI:     Tiffany Milligan is a 38 y.o. female, established patient of the clinic, presents with the following concerns:     Patient presents with acute onset of sharp, spastic right-sided low back pain for the past 2 weeks. Pain is nonradiating, worse with prolonged sitting or truncal flexion, better with lying down or resting. Patient has not tried any medication. She denies fever, chills, history of trauma to the spine, history of recent MVA, lower extremity weakness, paresthesia, bowel or bladder symptoms. Patient works from home and endorses prolonged sitting for at least 40-50 hours per week. Patient does have regular exercise 5-6 days weekly for weight loss.    Patient has a large sebaceous cyst at the right parietal area of the scalp. She was referred to dermatology. Surgical removal was planned. However, patient states that she might have made her dermatologist \"mad\". Therefore, the appointment was canceled. The lesion remains the same and doesn't bother patient from time to time with self grooming activity. However, patient is not interested in removal at this time. She wants to continue to monitor the lesion.    Patient has history of ulcerative proctitis diagnosed by [facility] in 2014. Patient had flexible sigmoidoscopy exam on October 8, 2014. She was found to have proctitis from 0-15 cm confirmed by biopsy. Family history was notable for uncle who has history of Crohn disease. Patient is currently symptom free. Denies abdominal pain, nausea, vomiting, hematochezia, melena, unexplained weight loss    Current medicines (including changes today)  Current Outpatient Prescriptions   Medication Sig Dispense Refill   • tizanidine (ZANAFLEX) 4 MG Tab Take 1 Tab by mouth every 6 hours as needed. 30 Tab 0   • naproxen (NAPROSYN) 500 MG Tab Take 1 Tab by mouth 2 times a day, with meals. 30 Tab 0   • ipratropium (ATROVENT) 0.02 % Solution 1 mL by Nebulization route 4 " "times a day. 30 Bullet 2   • cetirizine (ZYRTEC) 10 MG Tab Take 10 mg by mouth every day.     • omeprazole (PRILOSEC) 20 MG delayed-release capsule Take 20 mg by mouth every day.     • albuterol (PROAIR HFA) 108 (90 BASE) MCG/ACT Aero Soln inhalation aerosol Inhale 1-2 Puffs by mouth every 6 hours as needed for Shortness of Breath. 8.5 g 3   • DAYSEE 0.15-0.03 &0.01 MG Tab TAKE 1 TABLET BY MOUTH DAILY 91 Tab 0   • benzonatate (TESSALON) 100 MG Cap Take 1 Cap by mouth 3 times a day as needed for Cough. 60 Cap 0     No current facility-administered medications for this visit.      She  has a past medical history of Asthma; Colitis; Gout; and Heart burn.    I personally reviewed patient's problem list, allergies, medications, family hx, social hx with patient and update EPIC.     REVIEW OF SYSTEMS:  CONSTITUTIONAL:  Denies night sweats, fatigue, malaise, lethargy, fever or chills.  RESPIRATORY:  Denies cough, wheeze, hemoptysis, or shortness of breath.  CARDIOVASCULAR:  Denies chest pains, palpitations, pedal edema     Objective:     Blood pressure 120/74, pulse 87, temperature 36.9 °C (98.5 °F), height 1.575 m (5' 2\"), weight 67.1 kg (148 lb), SpO2 96 %. Body mass index is 27.07 kg/m².    Physical Exam:  Constitutional: awake, alert, in no distress.  Skin: Warm, dry, good turgor, no rashes, bruises, ulcers in visible areas.  - 1 cm firm, round, mobile, chalky scalp mass noted at the right parietal area,  Eye: conjunctiva clear, lids neg for edema or lesions.  Respiratory: Unlabored respiratory effort, lungs clear to auscultation, no wheezes, no rhonchi, no rales.  Cardiovascular: Normal S1, S2, no murmur, no pedal edema.  Psych: Oriented x3, affect and mood wnl, intact judgement and insight.   MSK:   No visible deformities of the spine, negative hematoma, lumbosacral ROM mild limited due to pain, lumbar spine are not tender to palpation, paraspinal muscles are mildly tender to palpation at the right lumbosacral area, " negatve CVA tenderness to percussion, Quadriceps strength: 5/5 bilaterally, Psoas strength: 5/5 bilaterally. Normal resisted toe extension, normal resisted plantar flexion. Patella reflexes are 2/4 bilaterally, Achilles tendon reflexes: 2/4 on bilaterally. Straight leg raise: negative bilaterally. Sensory perception to light touch: intact along L2, L3, L4, L5, and S1 dermatome.     Assessment and Plan:   The following treatment plan was discussed    1. Lumbosacral strain, initial encounter  History and exam consistent with acute right-sided lumbosacral strain, likely secondary to prolonged sitting and poor sitting posture. Plans:   - tizanidine (ZANAFLEX) 4 MG Tab; Take 1 Tab by mouth every 6 hours as needed.  Dispense: 30 Tab; Refill: 0  - naproxen (NAPROSYN) 500 MG Tab; Take 1 Tab by mouth 2 times a day, with meals.  Dispense: 30 Tab; Refill: 0  - side effects of all medications discussed with patient.   - Home rehabilitation exercises provided  - Discussed preventative measures and appropriate ergonomic office furniture to prevent future problems    2. Sebaceous cyst, scalp  Chronic, somewhat bothersome with self grooming activities, but no pain.   Recommended observation for now.     3. Ulcerative proctitis without complication (CMS-[facility])  History of ulcerative proctitis diagnosed by [facility] in 2014. Patient is asymptomatic. Family history positive for uncle with Crohn's disease. Plans:  - REFERRAL TO GASTROENTEROLOGY    Yuridia Zelaya M.D.      Followup: Return for As needed.    Please note that this dictation was created using voice recognition software. I have made every reasonable attempt to correct obvious errors, but I expect that there are errors of grammar and possibly content that I did not discover before finalizing the note.

## 2018-05-01 DIAGNOSIS — Z30.011 ENCOUNTER FOR INITIAL PRESCRIPTION OF CONTRACEPTIVE PILLS: ICD-10-CM

## 2018-05-01 RX ORDER — LEVONORGESTREL AND ETHINYL ESTRADIOL 150-30(84)
KIT ORAL
Qty: 91 TAB | Refills: 0 | Status: SHIPPED | OUTPATIENT
Start: 2018-05-01 | End: 2018-07-28 | Stop reason: SDUPTHER

## 2018-07-28 DIAGNOSIS — Z30.011 ENCOUNTER FOR INITIAL PRESCRIPTION OF CONTRACEPTIVE PILLS: ICD-10-CM

## 2018-07-30 RX ORDER — LEVONORGESTREL AND ETHINYL ESTRADIOL 150-30(84)
KIT ORAL
Qty: 91 TAB | Refills: 1 | Status: SHIPPED | OUTPATIENT
Start: 2018-07-30 | End: 2019-01-25 | Stop reason: SDUPTHER

## 2019-01-28 DIAGNOSIS — J45.30 REACTIVE AIRWAY DISEASE, MILD PERSISTENT, UNCOMPLICATED: ICD-10-CM

## 2019-01-30 RX ORDER — ALBUTEROL SULFATE 90 UG/1
AEROSOL, METERED RESPIRATORY (INHALATION)
Qty: 8.5 INHALER | Refills: 2 | Status: SHIPPED | OUTPATIENT
Start: 2019-01-30 | End: 2019-09-23 | Stop reason: SDUPTHER

## 2019-02-15 NOTE — PROGRESS NOTES
Subjective:      Tiffany Milligan is a 37 y.o. female who presents with Cough (x 10 days )            HPI  Pt with a cough for the past week , using nebulizer and Robitussin DMThe treatment seemed to help but overall her cough has persisted. He has no fever chills nausea vomiting diarrhea. Cough has persisted for a week and a half.    Allergies   Allergen Reactions   • Ranitidine      2003-04-21;abd pain     Social History     Social History   • Marital status: Single     Spouse name: N/A   • Number of children: N/A   • Years of education: N/A     Occupational History   • Not on file.     Social History Main Topics   • Smoking status: Never Smoker   • Smokeless tobacco: Never Used   • Alcohol use 0.6 oz/week     1 Glasses of wine per week   • Drug use: No   • Sexual activity: Yes     Partners: Male     Birth control/ protection: Pill     Other Topics Concern   • Not on file     Social History Narrative    Recently move to [place] from [place].     Currently employed, works at home.    ..........  Past Medical History:   Diagnosis Date   • Asthma    • Colitis    • Gout    • Heart burn      Current Outpatient Prescriptions on File Prior to Visit   Medication Sig Dispense Refill   • Levonorgest-Eth Estrad 91-Day (SEASONIQUE) 0.15-0.03 &0.01 MG Tab Taking one tablet daily. 91 Tab 3   • cetirizine (ZYRTEC) 10 MG Tab Take 10 mg by mouth every day.     • omeprazole (PRILOSEC) 20 MG delayed-release capsule Take 20 mg by mouth every day.     • albuterol (PROAIR HFA) 108 (90 BASE) MCG/ACT Aero Soln inhalation aerosol Inhale 1-2 Puffs by mouth every 6 hours as needed for Shortness of Breath. 8.5 g 3     No current facility-administered medications on file prior to visit.      Family History   Problem Relation Age of Onset   • Cancer Mother 40     did have genetic testing, neg   • Hypertension Mother    • Hyperlipidemia Mother    • Hypertension Father    • Hyperlipidemia Father    • No Known Problems Sister    • No Known Problems  "Brother      Review of Systems   Constitutional: Negative for fever.   HENT: Positive for congestion and sinus pain.    Eyes: Negative for pain, discharge and redness.   Respiratory: Positive for cough and shortness of breath. Negative for hemoptysis, sputum production and stridor.    Cardiovascular: Negative for chest pain, palpitations and orthopnea.   Gastrointestinal: Negative for abdominal pain, constipation and diarrhea.   Skin: Negative for rash.   Neurological: Negative for sensory change and speech change.   Psychiatric/Behavioral: Negative for hallucinations.          Objective:     /76   Pulse (!) 105   Temp 36.9 °C (98.4 °F)   Resp 18   Ht 1.575 m (5' 2\")   Wt 68 kg (150 lb)   SpO2 97%   BMI 27.44 kg/m²      Physical Exam   Constitutional: She appears well-developed and well-nourished. No distress.   HENT:   Head: Normocephalic and atraumatic.   Right Ear: External ear normal.   Eyes: Conjunctivae are normal.   Cardiovascular: Normal rate.    Pulmonary/Chest: Effort normal and breath sounds normal.   Abdominal: She exhibits no distension.   Musculoskeletal: Normal range of motion. She exhibits no deformity.   Skin: Skin is warm and dry. She is not diaphoretic. No erythema.   Psychiatric: She has a normal mood and affect. Her behavior is normal.   Nursing note and vitals reviewed.              Assessment/Plan:     DX Acute Bronchitis    I feel the patient has a viral bronchitis.  I am recommending the patient initiate/ continue hydration efforts including the use of a vaporizer/humidifier/ netti pot. I also recommend the pt, initiate Mucinex  Sudafed or Dayquil if not hypertensive. In addition the patient will initiate the prescribed prescription medication/s: tessalon. medrol. If the patient's condition exacerbates with worsening dysphagia, shortness of breath, uncontrolled fever, headache or chest pressure he/she will return immediately to the urgent care or go to  the emergency department " for further evaluation.    Karsten Vasques     No

## 2019-02-17 ENCOUNTER — OFFICE VISIT (OUTPATIENT)
Dept: URGENT CARE | Facility: PHYSICIAN GROUP | Age: 39
End: 2019-02-17
Payer: COMMERCIAL

## 2019-02-17 VITALS
TEMPERATURE: 98.5 F | BODY MASS INDEX: 27.6 KG/M2 | SYSTOLIC BLOOD PRESSURE: 130 MMHG | DIASTOLIC BLOOD PRESSURE: 84 MMHG | WEIGHT: 150 LBS | OXYGEN SATURATION: 98 % | HEART RATE: 82 BPM | HEIGHT: 62 IN

## 2019-02-17 DIAGNOSIS — R05.9 COUGH: ICD-10-CM

## 2019-02-17 DIAGNOSIS — J45.901 ASTHMA EXACERBATION, MILD: ICD-10-CM

## 2019-02-17 DIAGNOSIS — J06.9 ACUTE URI: ICD-10-CM

## 2019-02-17 PROCEDURE — 99214 OFFICE O/P EST MOD 30 MIN: CPT | Performed by: PHYSICIAN ASSISTANT

## 2019-02-17 RX ORDER — METHYLPREDNISOLONE 4 MG/1
TABLET ORAL
Qty: 1 KIT | Refills: 0 | Status: SHIPPED | OUTPATIENT
Start: 2019-02-17 | End: 2019-03-11

## 2019-02-17 RX ORDER — PANTOPRAZOLE SODIUM 40 MG/1
TABLET, DELAYED RELEASE ORAL
COMMUNITY
Start: 2012-12-06 | End: 2019-03-22

## 2019-02-17 RX ORDER — CETIRIZINE HYDROCHLORIDE, PSEUDOEPHEDRINE HYDROCHLORIDE 5; 120 MG/1; MG/1
TABLET, FILM COATED, EXTENDED RELEASE ORAL
COMMUNITY
End: 2020-07-02

## 2019-02-17 RX ORDER — ALBUTEROL SULFATE 90 UG/1
AEROSOL, METERED RESPIRATORY (INHALATION)
Refills: 2 | COMMUNITY
Start: 2019-01-30 | End: 2019-09-23

## 2019-02-17 RX ORDER — ALBUTEROL SULFATE 90 UG/1
AEROSOL, METERED RESPIRATORY (INHALATION)
COMMUNITY
Start: 2012-09-28 | End: 2019-03-22

## 2019-02-17 NOTE — PROGRESS NOTES
Subjective:      Tiffany Milligan is a 39 y.o. female who presents with Cough (x4 days. Cough, chest congestion, sore throat.)    PMH:  has a past medical history of Asthma; Colitis; Gout; and Heart burn.  MEDS:   Current Outpatient Prescriptions:   •  Mometasone Furo-Formoterol Fum (DULERA) 200-5 MCG/ACT Aerosol, by Nebulization route., Disp: , Rfl:   •  Norethin Ace-Eth Estrad-FE (LOESTRIN 24 FE PO), TAKE 1 TABLET ORALLY EVERY DAY, Disp: , Rfl:   •  pantoprazole (PROTONIX) 40 MG Tablet Delayed Response, TAKE 1 TABLET ORALLY EVERY DAY, Disp: , Rfl:   •  albuterol (VENTOLIN HFA) 108 (90 Base) MCG/ACT Aero Soln inhalation aerosol, USE 2 PUFFS EVERY 6 HOURS AS NEEDED, Disp: , Rfl:   •  albuterol 108 (90 Base) MCG/ACT Aero Soln inhalation aerosol, TAKE 1-2 PUFFS BY MOUTH EVERY 6 HOURS AS NEEDED FOR SHORTNESS OF BREATH, Disp: 8.5 Inhaler, Rfl: 2  •  DAYSEE 0.15-0.03 &0.01 MG Tab, TAKE 1 TABLET BY MOUTH EVERY DAY, Disp: 91 Tab, Rfl: 0  •  cetirizine (ZYRTEC) 10 MG Tab, Take 10 mg by mouth every day., Disp: , Rfl:   •  omeprazole (PRILOSEC) 20 MG delayed-release capsule, Take 20 mg by mouth every day., Disp: , Rfl:   •  cetirizine-psuedoephedrine (ZYRTEC-D) 5-120 MG per tablet, Take  by mouth., Disp: , Rfl:   •  fluticasone-salmeterol (ADVAIR DISKUS) 250-50 MCG/DOSE AEROSOL POWDER, BREATH ACTIVATED, 1 Puff by Nebulization route., Disp: , Rfl:   •  Norethin Ace-Eth Estrad-FE (LOESTRIN 24 FE PO), Take  by mouth., Disp: , Rfl:   •  albuterol 108 (90 Base) MCG/ACT Aero Soln inhalation aerosol, TAKE 1-2 PUFFS BY MOUTH EVERY 6 HOURS AS NEEDED FOR SHORTNESS OF BREATH, Disp: , Rfl: 2  •  ipratropium (ATROVENT) 0.02 % Solution, INHALE 1 VIAL VIA NEBULIZER EVERY 4 HOURS, Disp: 62.5 mL, Rfl: 1  •  tizanidine (ZANAFLEX) 4 MG Tab, Take 1 Tab by mouth every 6 hours as needed., Disp: 30 Tab, Rfl: 0  •  naproxen (NAPROSYN) 500 MG Tab, Take 1 Tab by mouth 2 times a day, with meals., Disp: 30 Tab, Rfl: 0  •  benzonatate (TESSALON) 100 MG  "Cap, Take 1 Cap by mouth 3 times a day as needed for Cough., Disp: 60 Cap, Rfl: 0  ALLERGIES:   Allergies   Allergen Reactions   • Ranitidine      2003-04-21;abd pain     SURGHX:   Past Surgical History:   Procedure Laterality Date   • CHOLECYSTECTOMY  2009     SOCHX:  reports that she has never smoked. She has never used smokeless tobacco. She reports that she drinks about 0.6 oz of alcohol per week . She reports that she does not use drugs.  FH: Reviewed with patient, not pertinent to this visit.           Patient presents with:  Cough: x4 days. Cough, chest congestion, sore throat.         Cough   This is a new problem. Episode onset: cough. The problem has been unchanged. The cough is productive of sputum. Associated symptoms include headaches, nasal congestion, postnasal drip, rhinorrhea, a sore throat and wheezing. Pertinent negatives include no fever. The symptoms are aggravated by lying down and cold air. She has tried body position changes and OTC cough suppressant for the symptoms. Her past medical history is significant for asthma.       Review of Systems   Constitutional: Negative for fever.   HENT: Positive for congestion, postnasal drip, rhinorrhea and sore throat.    Respiratory: Positive for cough, sputum production and wheezing.    Neurological: Positive for headaches.   All other systems reviewed and are negative.         Objective:     /84   Pulse 91   Temp 36.9 °C (98.5 °F)   Ht 1.575 m (5' 2\")   Wt 68 kg (150 lb)   SpO2 93%   BMI 27.44 kg/m²      Physical Exam   Constitutional: She is oriented to person, place, and time. She appears well-developed and well-nourished.   HENT:   Head: Normocephalic and atraumatic.   Mouth/Throat: Oropharynx is clear and moist.   Eyes: Pupils are equal, round, and reactive to light. Conjunctivae and EOM are normal.   Neck: Normal range of motion. Neck supple.   Cardiovascular: Normal rate, regular rhythm and normal heart sounds.    Pulmonary/Chest: " Effort normal. No respiratory distress. She has wheezes. She has rhonchi.   Abdominal: Soft.   Musculoskeletal: Normal range of motion.   Neurological: She is alert and oriented to person, place, and time. Gait normal.   Skin: Skin is warm and dry. Capillary refill takes less than 2 seconds.   Psychiatric: She has a normal mood and affect.   Nursing note and vitals reviewed.           Pt declined neb treatment  Assessment/Plan:     1. Cough  MethylPREDNISolone (MEDROL DOSEPAK) 4 MG Tablet Therapy Pack   2. Asthma exacerbation, mild  MethylPREDNISolone (MEDROL DOSEPAK) 4 MG Tablet Therapy Pack   3. Acute URI  MethylPREDNISolone (MEDROL DOSEPAK) 4 MG Tablet Therapy Pack     PT can continue OTC medications, increase fluids and rest until symptoms improve.     PT should follow up with PCP in 1-2 days for re-evaluation if symptoms have not improved.  Discussed red flags and reasons to return to UC or ED.  Pt and/or family verbalized understanding of diagnosis and follow up instructions and was offered informational handout on diagnosis.  PT discharged.

## 2019-02-18 ASSESSMENT — ENCOUNTER SYMPTOMS
WHEEZING: 1
HEADACHES: 1
COUGH: 1
SPUTUM PRODUCTION: 1
SORE THROAT: 1
RHINORRHEA: 1
FEVER: 0

## 2019-03-11 ENCOUNTER — OFFICE VISIT (OUTPATIENT)
Dept: URGENT CARE | Facility: PHYSICIAN GROUP | Age: 39
End: 2019-03-11
Payer: COMMERCIAL

## 2019-03-11 VITALS
SYSTOLIC BLOOD PRESSURE: 104 MMHG | OXYGEN SATURATION: 96 % | HEART RATE: 96 BPM | BODY MASS INDEX: 26.58 KG/M2 | HEIGHT: 63 IN | WEIGHT: 150 LBS | DIASTOLIC BLOOD PRESSURE: 62 MMHG | RESPIRATION RATE: 13 BRPM | TEMPERATURE: 99.3 F

## 2019-03-11 DIAGNOSIS — J22 LOWER RESPIRATORY INFECTION (E.G., BRONCHITIS, PNEUMONIA, PNEUMONITIS, PULMONITIS): ICD-10-CM

## 2019-03-11 DIAGNOSIS — J45.901 ASTHMA WITH ACUTE EXACERBATION, UNSPECIFIED ASTHMA SEVERITY, UNSPECIFIED WHETHER PERSISTENT: ICD-10-CM

## 2019-03-11 PROCEDURE — 99214 OFFICE O/P EST MOD 30 MIN: CPT | Performed by: PHYSICIAN ASSISTANT

## 2019-03-11 RX ORDER — DOXYCYCLINE HYCLATE 100 MG
100 TABLET ORAL 2 TIMES DAILY
Qty: 20 TAB | Refills: 0 | Status: SHIPPED | OUTPATIENT
Start: 2019-03-11 | End: 2019-03-21

## 2019-03-11 RX ORDER — BENZONATATE 100 MG/1
100 CAPSULE ORAL 3 TIMES DAILY PRN
Qty: 30 CAP | Refills: 0 | Status: SHIPPED | OUTPATIENT
Start: 2019-03-11 | End: 2019-03-22

## 2019-03-11 RX ORDER — PREDNISONE 20 MG/1
20 TABLET ORAL DAILY
Qty: 3 TAB | Refills: 0 | Status: SHIPPED | OUTPATIENT
Start: 2019-03-11 | End: 2019-03-14

## 2019-03-11 ASSESSMENT — COPD QUESTIONNAIRES: COPD: 0

## 2019-03-11 ASSESSMENT — ENCOUNTER SYMPTOMS
CHILLS: 0
FEVER: 0
COUGH: 1
SHORTNESS OF BREATH: 1

## 2019-03-11 NOTE — PROGRESS NOTES
Subjective:   Tiffany Milligan is a 39 y.o. female who presents for Cough (SOB x 1 month, )        Pt complains of cough x 4 weeks. Additionally she is having SOB x 3 days.  Albuterol temporarily helps. Taking PROAir daily.    Cough   This is a new problem. The current episode started 1 to 4 weeks ago. The problem has been unchanged. The problem occurs constantly. The cough is non-productive. Associated symptoms include shortness of breath. Pertinent negatives include no chest pain, chills, fever, nasal congestion or postnasal drip. The symptoms are aggravated by cold air. She has tried a beta-agonist inhaler and rest for the symptoms. The treatment provided mild relief. Her past medical history is significant for asthma and bronchitis. There is no history of COPD, emphysema or pneumonia.     Review of Systems   Constitutional: Negative for chills and fever.   HENT: Negative for postnasal drip.    Respiratory: Positive for cough and shortness of breath.    Cardiovascular: Negative for chest pain.       PMH:  has a past medical history of Asthma; Colitis; Gout; and Heart burn.  MEDS:   Current Outpatient Prescriptions:   •  doxycycline (VIBRAMYCIN) 100 MG Tab, Take 1 Tab by mouth 2 times a day for 10 days., Disp: 20 Tab, Rfl: 0  •  benzonatate (TESSALON) 100 MG Cap, Take 1 Cap by mouth 3 times a day as needed for Cough., Disp: 30 Cap, Rfl: 0  •  predniSONE (DELTASONE) 20 MG Tab, Take 1 Tab by mouth every day for 3 days., Disp: 3 Tab, Rfl: 0  •  cetirizine-psuedoephedrine (ZYRTEC-D) 5-120 MG per tablet, Take  by mouth., Disp: , Rfl:   •  albuterol (VENTOLIN HFA) 108 (90 Base) MCG/ACT Aero Soln inhalation aerosol, USE 2 PUFFS EVERY 6 HOURS AS NEEDED, Disp: , Rfl:   •  albuterol 108 (90 Base) MCG/ACT Aero Soln inhalation aerosol, TAKE 1-2 PUFFS BY MOUTH EVERY 6 HOURS AS NEEDED FOR SHORTNESS OF BREATH, Disp: , Rfl: 2  •  albuterol 108 (90 Base) MCG/ACT Aero Soln inhalation aerosol, TAKE 1-2 PUFFS BY MOUTH EVERY 6 HOURS AS  "NEEDED FOR SHORTNESS OF BREATH, Disp: 8.5 Inhaler, Rfl: 2  •  ipratropium (ATROVENT) 0.02 % Solution, INHALE 1 VIAL VIA NEBULIZER EVERY 4 HOURS, Disp: 62.5 mL, Rfl: 1  •  DAYSEE 0.15-0.03 &0.01 MG Tab, TAKE 1 TABLET BY MOUTH EVERY DAY, Disp: 91 Tab, Rfl: 0  •  naproxen (NAPROSYN) 500 MG Tab, Take 1 Tab by mouth 2 times a day, with meals., Disp: 30 Tab, Rfl: 0  •  cetirizine (ZYRTEC) 10 MG Tab, Take 10 mg by mouth every day., Disp: , Rfl:   •  omeprazole (PRILOSEC) 20 MG delayed-release capsule, Take 20 mg by mouth every day., Disp: , Rfl:   •  Mometasone Furo-Formoterol Fum (DULERA) 200-5 MCG/ACT Aerosol, by Nebulization route., Disp: , Rfl:   •  fluticasone-salmeterol (ADVAIR DISKUS) 250-50 MCG/DOSE AEROSOL POWDER, BREATH ACTIVATED, 1 Puff by Nebulization route., Disp: , Rfl:   •  Norethin Ace-Eth Estrad-FE (LOESTRIN 24 FE PO), Take  by mouth., Disp: , Rfl:   •  Norethin Ace-Eth Estrad-FE (LOESTRIN 24 FE PO), TAKE 1 TABLET ORALLY EVERY DAY, Disp: , Rfl:   •  pantoprazole (PROTONIX) 40 MG Tablet Delayed Response, TAKE 1 TABLET ORALLY EVERY DAY, Disp: , Rfl:   •  tizanidine (ZANAFLEX) 4 MG Tab, Take 1 Tab by mouth every 6 hours as needed., Disp: 30 Tab, Rfl: 0  ALLERGIES:   Allergies   Allergen Reactions   • Ranitidine      2003-04-21;abd pain     SURGHX:   Past Surgical History:   Procedure Laterality Date   • CHOLECYSTECTOMY  2009     SOCHX:  reports that she has never smoked. She has never used smokeless tobacco. She reports that she drinks about 0.6 oz of alcohol per week . She reports that she does not use drugs.  FH: Family history was reviewed, no pertinent findings to report   Objective:   /62   Pulse 96   Temp 37.4 °C (99.3 °F)   Resp 13   Ht 1.6 m (5' 3\")   Wt 68 kg (150 lb)   SpO2 96%   BMI 26.57 kg/m²   Physical Exam   Constitutional: Vital signs are normal. She appears well-developed and well-nourished.  Non-toxic appearance. She does not appear ill. No distress.   HENT:   Head: " Normocephalic and atraumatic.   Right Ear: Tympanic membrane, external ear and ear canal normal.   Left Ear: Tympanic membrane, external ear and ear canal normal.   Nose: Mucosal edema present. No rhinorrhea.   Mouth/Throat: Uvula is midline and mucous membranes are normal. Posterior oropharyngeal erythema present.   Neck: Neck supple.   Pulmonary/Chest: Effort normal. No respiratory distress. She has no decreased breath sounds. She has wheezes in the right upper field and the left upper field. She has rhonchi in the right upper field and the left upper field. She has no rales.   Neurological: She is alert.   Skin: Skin is warm and dry. Capillary refill takes less than 2 seconds.   Psychiatric: She has a normal mood and affect. Her speech is normal and behavior is normal. Judgment and thought content normal. Cognition and memory are normal.   Vitals reviewed.        Assessment/Plan:   1. Lower respiratory infection (e.g., bronchitis, pneumonia, pneumonitis, pulmonitis)  - doxycycline (VIBRAMYCIN) 100 MG Tab; Take 1 Tab by mouth 2 times a day for 10 days.  Dispense: 20 Tab; Refill: 0  - benzonatate (TESSALON) 100 MG Cap; Take 1 Cap by mouth 3 times a day as needed for Cough.  Dispense: 30 Cap; Refill: 0  - predniSONE (DELTASONE) 20 MG Tab; Take 1 Tab by mouth every day for 3 days.  Dispense: 3 Tab; Refill: 0    2. Asthma with acute exacerbation, unspecified asthma severity, unspecified whether persistent  - doxycycline (VIBRAMYCIN) 100 MG Tab; Take 1 Tab by mouth 2 times a day for 10 days.  Dispense: 20 Tab; Refill: 0  - benzonatate (TESSALON) 100 MG Cap; Take 1 Cap by mouth 3 times a day as needed for Cough.  Dispense: 30 Cap; Refill: 0  - predniSONE (DELTASONE) 20 MG Tab; Take 1 Tab by mouth every day for 3 days.  Dispense: 3 Tab; Refill: 0    Physical exam suggestive of lower respiratory infection with asthma exacerbation.  Clinical suspicion for pneumonia is low.  Patient started on antibiotics and short course  of prednisone.      Pt instructed to complete full course of medication despite symptomatic improvement. Pt to take med with meals to alleviate GI upset. Pt to take a probiotic or eat [employer] yogurt/ kefir while taking the medication.    Symptoms worsen or fail to improve patient instructed to return to clinic for reevaluation.  If patient develops severe symptoms to include worsening shortness of breath, dyspnea, elevated fevers-she was instructed to go the emergency room for evaluation.    Differential diagnosis, natural history, supportive care, and indications for immediate follow-up discussed.

## 2019-03-22 ENCOUNTER — HOSPITAL ENCOUNTER (OUTPATIENT)
Dept: RADIOLOGY | Facility: MEDICAL CENTER | Age: 39
End: 2019-03-22
Attending: FAMILY MEDICINE
Payer: COMMERCIAL

## 2019-03-22 ENCOUNTER — OFFICE VISIT (OUTPATIENT)
Dept: MEDICAL GROUP | Age: 39
End: 2019-03-22
Payer: COMMERCIAL

## 2019-03-22 VITALS
TEMPERATURE: 98.9 F | BODY MASS INDEX: 27.25 KG/M2 | WEIGHT: 153.8 LBS | DIASTOLIC BLOOD PRESSURE: 66 MMHG | OXYGEN SATURATION: 95 % | HEIGHT: 63 IN | SYSTOLIC BLOOD PRESSURE: 106 MMHG | HEART RATE: 92 BPM

## 2019-03-22 DIAGNOSIS — R06.02 SOB (SHORTNESS OF BREATH): ICD-10-CM

## 2019-03-22 DIAGNOSIS — L72.3 SEBACEOUS CYST: ICD-10-CM

## 2019-03-22 DIAGNOSIS — J45.20 MILD INTERMITTENT REACTIVE AIRWAY DISEASE WITHOUT COMPLICATION: ICD-10-CM

## 2019-03-22 DIAGNOSIS — J04.0 VIRAL LARYNGITIS: ICD-10-CM

## 2019-03-22 DIAGNOSIS — K21.9 GASTROESOPHAGEAL REFLUX DISEASE WITHOUT ESOPHAGITIS: ICD-10-CM

## 2019-03-22 DIAGNOSIS — F51.02 ADJUSTMENT INSOMNIA: ICD-10-CM

## 2019-03-22 DIAGNOSIS — Z30.011 ENCOUNTER FOR INITIAL PRESCRIPTION OF CONTRACEPTIVE PILLS: ICD-10-CM

## 2019-03-22 DIAGNOSIS — B97.89 VIRAL LARYNGITIS: ICD-10-CM

## 2019-03-22 DIAGNOSIS — Z23 NEED FOR VACCINATION: ICD-10-CM

## 2019-03-22 DIAGNOSIS — Z00.00 PE (PHYSICAL EXAM), ANNUAL: Primary | ICD-10-CM

## 2019-03-22 PROCEDURE — 90715 TDAP VACCINE 7 YRS/> IM: CPT | Performed by: FAMILY MEDICINE

## 2019-03-22 PROCEDURE — 90732 PPSV23 VACC 2 YRS+ SUBQ/IM: CPT | Performed by: FAMILY MEDICINE

## 2019-03-22 PROCEDURE — 71046 X-RAY EXAM CHEST 2 VIEWS: CPT

## 2019-03-22 PROCEDURE — 90472 IMMUNIZATION ADMIN EACH ADD: CPT | Performed by: FAMILY MEDICINE

## 2019-03-22 PROCEDURE — 99395 PREV VISIT EST AGE 18-39: CPT | Mod: 25 | Performed by: FAMILY MEDICINE

## 2019-03-22 PROCEDURE — 90471 IMMUNIZATION ADMIN: CPT | Performed by: FAMILY MEDICINE

## 2019-03-22 RX ORDER — CODEINE PHOSPHATE AND GUAIFENESIN 10; 100 MG/5ML; MG/5ML
5 SOLUTION ORAL EVERY 4 HOURS PRN
Qty: 200 ML | Refills: 0 | Status: SHIPPED | OUTPATIENT
Start: 2019-03-22 | End: 2019-04-01

## 2019-03-22 RX ORDER — ZOLPIDEM TARTRATE 5 MG/1
5 TABLET ORAL NIGHTLY PRN
Qty: 30 TAB | Refills: 0 | Status: SHIPPED | OUTPATIENT
Start: 2019-03-22 | End: 2019-04-04

## 2019-03-22 ASSESSMENT — PATIENT HEALTH QUESTIONNAIRE - PHQ9: CLINICAL INTERPRETATION OF PHQ2 SCORE: 0

## 2019-03-22 NOTE — PROGRESS NOTES
Subjective:   CC: annual PE     HPI:     Tiffany Milligan is a 39 y.o. female who is here as an established patient and presents for annual PE,     Patient complains of acute development of persistent cough onset approximately five weeks. Patient was first evaluated at  on 19 where she was prescribed a Medrol Dosepak. The steroid did not help resolve her symptoms. Patient subsequently developed shortness of breath and hoarseness two weeks ago and was re-evaluated at  on 3/11/19 and was prescribed doxycycline, Tessalon and another course of prednisone. None of the medications resolved her symptoms and she continues to endorse cough, mild shortness of breath, and hoarseness at this time. Patient has been using her nebulizer BID and albuterol inhaler since the cough onset with temporary, mild relief. She denies any fever or chills. She denies smoking or being exposed to second-hand smoke. Patient did not receive the flu vaccine this year. History of chronic vocal cord dysfunction controlled with Zyrtec PRN. Pt has hx of mild intermittent asthma, usually controlled with albuterol inhaler. Denies fever, chills, nausea, vomiting.     Pt is , sexually active, on OCP for contraception.     Patient complains of acute insomnia onset after her mother passed away from metastatic breast cancer in . Her mother was 66 years old. Patient has tried taking melatonin and tylenol PM with mild relief. She has trouble falling asleep but denies any problem staying asleep. She has never tried taking any prescription medications. Patient does not feel lonely but endorses sadness. She feels supported by her brother who recently moved to [place] and states her sadness is slowly improving.     Pt has hx of sebaceous cyst of the scalp. She was referred to dermatology, but did not schedule appointment for removal. She reports that the cyst changed from soft to hard and is tender to palpation. She is interested in returning  "in the future for cyst removal.     Current medicines (including changes today)  Current Outpatient Prescriptions   Medication Sig Dispense Refill   • fluticasone-salmeterol (ADVAIR DISKUS) 250-50 MCG/DOSE AEROSOL POWDER, BREATH ACTIVATED Inhale 1 Puff by mouth every 12 hours. 1 Inhaler 2   • guaifenesin-codeine (ROBITUSSIN AC) Solution oral solution Take 5 mL by mouth every four hours as needed for Cough for up to 10 days. 200 mL 0   • zolpidem (AMBIEN) 5 MG Tab Take 1 Tab by mouth at bedtime as needed for Sleep for up to 30 days. 30 Tab 0   • cetirizine-psuedoephedrine (ZYRTEC-D) 5-120 MG per tablet Take  by mouth.     • albuterol 108 (90 Base) MCG/ACT Aero Soln inhalation aerosol TAKE 1-2 PUFFS BY MOUTH EVERY 6 HOURS AS NEEDED FOR SHORTNESS OF BREATH  2   • albuterol 108 (90 Base) MCG/ACT Aero Soln inhalation aerosol TAKE 1-2 PUFFS BY MOUTH EVERY 6 HOURS AS NEEDED FOR SHORTNESS OF BREATH 8.5 Inhaler 2   • ipratropium (ATROVENT) 0.02 % Solution INHALE 1 VIAL VIA NEBULIZER EVERY 4 HOURS 62.5 mL 1   • DAYSEE 0.15-0.03 &0.01 MG Tab TAKE 1 TABLET BY MOUTH EVERY DAY 91 Tab 0   • cetirizine (ZYRTEC) 10 MG Tab Take 10 mg by mouth every day.     • omeprazole (PRILOSEC) 20 MG delayed-release capsule Take 20 mg by mouth every day.       No current facility-administered medications for this visit.      She  has a past medical history of Asthma; Colitis; Gout; Heart burn; History of cholecystectomy (1/9/2013); and Proctitis (12/23/2016).    I personally reviewed patient's problem list, allergies, medications, family hx, social hx with patient and update EPIC.     REVIEW OF SYSTEMS:  CONSTITUTIONAL:  Denies night sweats, fatigue, malaise, lethargy, fever or chills.  RESPIRATORY:  Denies hemoptysis  CARDIOVASCULAR:  Denies chest pains, palpitations, pedal edema     Objective:     Blood pressure 106/66, pulse 92, temperature 37.2 °C (98.9 °F), temperature source Temporal, height 1.6 m (5' 3\"), weight 69.8 kg (153 lb 12.8 oz), " last menstrual period 02/02/2019, SpO2 95 %, not currently breastfeeding. Body mass index is 27.24 kg/m².    Physical Exam:  Constitutional: awake, alert, in no distress, overweight.  Skin: Warm, dry, good turgor, no rashes, bruises, ulcers in visible areas.  - 1.5x2 cm, mildly erythematous, moderately tender scalp sebaceous cyst on the right parietal scalp.   Eye: conjunctiva clear, lids neg for edema or lesions.  ENMT: TM and auditory canals wnl. Inflamed nasal mucosa with mild inferior nasal turbinate hypertrophy bilaterally. Oral mucosa wnl. Lips without lesions, good dentition. Throat mildly hyperemic and mild tonsillar hypertrophy.   Neck: Trachea midline, no masses, no thyromegaly. No cervical or supraclavicular lymphadenopathy  Respiratory: Unlabored respiratory effort, lungs clear to auscultation, no wheezes, no rales.  Cardiovascular: Normal S1, S2, no murmur, no pedal edema.  Psych: Oriented x3, affect and mood wnl, intact judgement and insight.     Assessment and Plan:   The following treatment plan was discussed    1. SOB (shortness of breath)  2. Mild intermittent reactive airway disease without complication  3. Viral laryngitis  Hx of mild intermittent asthma, previously well controlled with Albuterol inhaler PRN. She develops acute cough and mild SoB for approximately five weeks. She was seen by  twice and was on 2 courses of oral steroid, doxycycline, and cough suppression w/o significant relief. She is afebrile, hemodynamically stable, sat 95% on room air, well appearing on exam, cardiopulmonary exam was unremarkable.   Plans:   - DX-CHEST-2 VIEWS; Future  - fluticasone-salmeterol (ADVAIR DISKUS) 250-50 MCG/DOSE AEROSOL POWDER, BREATH ACTIVATED; Inhale 1 Puff by mouth every 12 hours.  Dispense: 1 Inhaler; Refill: 2  - Guaifenesin-codeine (ROBITUSSIN AC) Solution oral solution; Take 5 mL by mouth every four hours as needed for Cough for up to 10 days.  Dispense: 200 mL; Refill: 0  - continue  Albuterol inhalers Q6H PRN for SOB  - strict ER precautions    4. Adjustment insomnia  Hx and exam concerning for adjustment insomnia relating to her mother death. Plans:   - zolpidem (AMBIEN) 5 MG Tab; Take 1 Tab by mouth at bedtime as needed for Sleep for up to 30 days.  Dispense: 30 Tab; Refill: 0  - risks, benefits, side effects, and general counseling related to Ambien discussed with patient.   - regular exercise, well-balanced diet, multi-vitamin daily, meditation, stress reduction.   - Avoid excessive consumption of stimulants, alcohol, illicit drugs, tobacco  - Avoid using computer or electronic devices at night  - Avoid watching TV on bed  - Avoid napping during the day  - f/u PRN    5. Gastroesophageal reflux disease without esophagitis  Chronic, controlled with Omeprazole, will continue.   -Recommended dietary and behavioral modification: weight loss, avoid loose fitting, elevated the head of the bed, avoid common food triggers (fatty or fried foods, tomato sauce, alcohol, chocolate, mint, garlic, onion, and caffeine), smoking cessation, avoid excessive alcohol consumption.     6. Inflamed, sebaceous cyst, scalp  - will schedule appointment for removal.     7. Encounter for initial prescription of contraceptive pills  , sexually active, on OCP and tolerates it well, will continue.     8. Need for vaccination  - TDAP VACCINE =>6YO IM  - Pneumococal Polysaccharide Vaccine 23-Valent =>1yo SQ/IM    9. PE (physical exam), annual  - pt is counseled on diet, exercise, vitamin supplement, mental health, sleep, stress  - discussed screening guidelines for pap, STD, mammogram, colonoscopy and vaccine recommendationwwe   - CBC WITH DIFFERENTIAL; Future  - HEMOGLOBIN A1C; Future  - Comp Metabolic Panel; Future  - Lipid Profile; Future  - VITAMIN D,25 HYDROXY; Future  - TSH WITH REFLEX TO FT4;diagmed     Yuridia Zelaya M.D.    Followup: Return for scalp sebaceous cyst removal.    Please note that this dictation was  created using voice recognition software and/or scribes. I have made every reasonable attempt to correct obvious errors, but I expect that there are errors of grammar and possibly content that I did not discover before finalizing the note.     I, Tank Oh (Scribe), am scribing for, and in the presence of, Yuridia Zelaya M.D.    Electronically signed by: Tank Oh (Elsyibe), 3/22/2019    IYuridia M.D. personally performed the services described in this documentation, as scribed by Tank Oh in my presence, and it is both accurate and complete.

## 2019-03-24 PROBLEM — M10.071 ACUTE IDIOPATHIC GOUT INVOLVING TOE OF RIGHT FOOT: Status: RESOLVED | Noted: 2017-03-23 | Resolved: 2019-03-24

## 2019-03-25 DIAGNOSIS — J38.3 VOCAL CORD DYSFUNCTION: ICD-10-CM

## 2019-03-30 ENCOUNTER — HOSPITAL ENCOUNTER (OUTPATIENT)
Dept: LAB | Facility: MEDICAL CENTER | Age: 39
End: 2019-03-30
Attending: FAMILY MEDICINE
Payer: COMMERCIAL

## 2019-03-30 DIAGNOSIS — Z00.00 PE (PHYSICAL EXAM), ANNUAL: ICD-10-CM

## 2019-03-30 LAB
25(OH)D3 SERPL-MCNC: 19 NG/ML (ref 30–100)
ALBUMIN SERPL BCP-MCNC: 3.8 G/DL (ref 3.2–4.9)
ALBUMIN/GLOB SERPL: 1 G/DL
ALP SERPL-CCNC: 51 U/L (ref 30–99)
ALT SERPL-CCNC: 12 U/L (ref 2–50)
ANION GAP SERPL CALC-SCNC: 7 MMOL/L (ref 0–11.9)
AST SERPL-CCNC: 14 U/L (ref 12–45)
BASOPHILS # BLD AUTO: 0.9 % (ref 0–1.8)
BASOPHILS # BLD: 0.06 K/UL (ref 0–0.12)
BILIRUB SERPL-MCNC: 0.3 MG/DL (ref 0.1–1.5)
BUN SERPL-MCNC: 11 MG/DL (ref 8–22)
CALCIUM SERPL-MCNC: 9.4 MG/DL (ref 8.5–10.5)
CHLORIDE SERPL-SCNC: 106 MMOL/L (ref 96–112)
CHOLEST SERPL-MCNC: 197 MG/DL (ref 100–199)
CO2 SERPL-SCNC: 25 MMOL/L (ref 20–33)
CREAT SERPL-MCNC: 0.7 MG/DL (ref 0.5–1.4)
EOSINOPHIL # BLD AUTO: 0 K/UL (ref 0–0.51)
EOSINOPHIL NFR BLD: 0 % (ref 0–6.9)
ERYTHROCYTE [DISTWIDTH] IN BLOOD BY AUTOMATED COUNT: 44.1 FL (ref 35.9–50)
EST. AVERAGE GLUCOSE BLD GHB EST-MCNC: 120 MG/DL
FASTING STATUS PATIENT QL REPORTED: NORMAL
GLOBULIN SER CALC-MCNC: 3.8 G/DL (ref 1.9–3.5)
GLUCOSE SERPL-MCNC: 75 MG/DL (ref 65–99)
HBA1C MFR BLD: 5.8 % (ref 0–5.6)
HCT VFR BLD AUTO: 39.7 % (ref 37–47)
HDLC SERPL-MCNC: 50 MG/DL
HGB BLD-MCNC: 12.9 G/DL (ref 12–16)
LDLC SERPL CALC-MCNC: 116 MG/DL
LYMPHOCYTES # BLD AUTO: 3.23 K/UL (ref 1–4.8)
LYMPHOCYTES NFR BLD: 48.2 % (ref 22–41)
MANUAL DIFF BLD: NORMAL
MCH RBC QN AUTO: 29.5 PG (ref 27–33)
MCHC RBC AUTO-ENTMCNC: 32.5 G/DL (ref 33.6–35)
MCV RBC AUTO: 90.6 FL (ref 81.4–97.8)
MONOCYTES # BLD AUTO: 0.47 K/UL (ref 0–0.85)
MONOCYTES NFR BLD AUTO: 7 % (ref 0–13.4)
MORPHOLOGY BLD-IMP: NORMAL
NEUTROPHILS # BLD AUTO: 2.88 K/UL (ref 2–7.15)
NEUTROPHILS NFR BLD: 41.2 % (ref 44–72)
NEUTS BAND NFR BLD MANUAL: 1.8 % (ref 0–10)
NRBC # BLD AUTO: 0 K/UL
NRBC BLD-RTO: 0 /100 WBC
PLATELET # BLD AUTO: 537 K/UL (ref 164–446)
PLATELET BLD QL SMEAR: NORMAL
PMV BLD AUTO: 8.8 FL (ref 9–12.9)
POTASSIUM SERPL-SCNC: 4.2 MMOL/L (ref 3.6–5.5)
PROT SERPL-MCNC: 7.6 G/DL (ref 6–8.2)
RBC # BLD AUTO: 4.38 M/UL (ref 4.2–5.4)
RBC BLD AUTO: PRESENT
SMUDGE CELLS BLD QL SMEAR: NORMAL
SODIUM SERPL-SCNC: 138 MMOL/L (ref 135–145)
TRIGL SERPL-MCNC: 157 MG/DL (ref 0–149)
TSH SERPL DL<=0.005 MIU/L-ACNC: 1.59 UIU/ML (ref 0.38–5.33)
VARIANT LYMPHS BLD QL SMEAR: NORMAL
WBC # BLD AUTO: 6.7 K/UL (ref 4.8–10.8)

## 2019-03-30 PROCEDURE — 83036 HEMOGLOBIN GLYCOSYLATED A1C: CPT

## 2019-03-30 PROCEDURE — 85027 COMPLETE CBC AUTOMATED: CPT

## 2019-03-30 PROCEDURE — 80061 LIPID PANEL: CPT

## 2019-03-30 PROCEDURE — 82306 VITAMIN D 25 HYDROXY: CPT

## 2019-03-30 PROCEDURE — 36415 COLL VENOUS BLD VENIPUNCTURE: CPT

## 2019-03-30 PROCEDURE — 80053 COMPREHEN METABOLIC PANEL: CPT

## 2019-03-30 PROCEDURE — 84443 ASSAY THYROID STIM HORMONE: CPT

## 2019-03-30 PROCEDURE — 85007 BL SMEAR W/DIFF WBC COUNT: CPT

## 2019-04-04 ENCOUNTER — HOSPITAL ENCOUNTER (OUTPATIENT)
Facility: MEDICAL CENTER | Age: 39
End: 2019-04-04
Attending: FAMILY MEDICINE
Payer: COMMERCIAL

## 2019-04-04 ENCOUNTER — HOSPITAL ENCOUNTER (OUTPATIENT)
Dept: LAB | Facility: MEDICAL CENTER | Age: 39
End: 2019-04-04
Attending: FAMILY MEDICINE
Payer: COMMERCIAL

## 2019-04-04 ENCOUNTER — OFFICE VISIT (OUTPATIENT)
Dept: MEDICAL GROUP | Age: 39
End: 2019-04-04
Payer: COMMERCIAL

## 2019-04-04 VITALS
DIASTOLIC BLOOD PRESSURE: 72 MMHG | HEART RATE: 69 BPM | SYSTOLIC BLOOD PRESSURE: 114 MMHG | TEMPERATURE: 97.4 F | WEIGHT: 156 LBS | BODY MASS INDEX: 27.64 KG/M2 | HEIGHT: 63 IN | OXYGEN SATURATION: 96 %

## 2019-04-04 DIAGNOSIS — Z11.51 SPECIAL SCREENING EXAMINATION FOR HUMAN PAPILLOMAVIRUS (HPV): ICD-10-CM

## 2019-04-04 DIAGNOSIS — Z11.3 SCREEN FOR STD (SEXUALLY TRANSMITTED DISEASE): ICD-10-CM

## 2019-04-04 DIAGNOSIS — R73.03 PREDIABETES: ICD-10-CM

## 2019-04-04 DIAGNOSIS — Z01.419 PAP SMEAR, LOW-RISK: ICD-10-CM

## 2019-04-04 DIAGNOSIS — Z01.419 WELL WOMAN EXAM: ICD-10-CM

## 2019-04-04 DIAGNOSIS — E78.2 MIXED HYPERLIPIDEMIA: ICD-10-CM

## 2019-04-04 DIAGNOSIS — J38.3 VOCAL CORD DYSFUNCTION: ICD-10-CM

## 2019-04-04 DIAGNOSIS — Z12.4 ROUTINE CERVICAL SMEAR: ICD-10-CM

## 2019-04-04 DIAGNOSIS — E55.9 VITAMIN D DEFICIENCY: ICD-10-CM

## 2019-04-04 DIAGNOSIS — F51.02 ADJUSTMENT INSOMNIA: ICD-10-CM

## 2019-04-04 LAB
HIV 1+2 AB+HIV1 P24 AG SERPL QL IA: NON REACTIVE
TREPONEMA PALLIDUM IGG+IGM AB [PRESENCE] IN SERUM OR PLASMA BY IMMUNOASSAY: NON REACTIVE

## 2019-04-04 PROCEDURE — 87491 CHLMYD TRACH DNA AMP PROBE: CPT

## 2019-04-04 PROCEDURE — 87389 HIV-1 AG W/HIV-1&-2 AB AG IA: CPT

## 2019-04-04 PROCEDURE — 36415 COLL VENOUS BLD VENIPUNCTURE: CPT

## 2019-04-04 PROCEDURE — 99214 OFFICE O/P EST MOD 30 MIN: CPT | Mod: 25 | Performed by: FAMILY MEDICINE

## 2019-04-04 PROCEDURE — 87624 HPV HI-RISK TYP POOLED RSLT: CPT

## 2019-04-04 PROCEDURE — 99000 SPECIMEN HANDLING OFFICE-LAB: CPT | Performed by: FAMILY MEDICINE

## 2019-04-04 PROCEDURE — 86780 TREPONEMA PALLIDUM: CPT

## 2019-04-04 PROCEDURE — 99395 PREV VISIT EST AGE 18-39: CPT | Mod: 25 | Performed by: FAMILY MEDICINE

## 2019-04-04 PROCEDURE — 87591 N.GONORRHOEAE DNA AMP PROB: CPT

## 2019-04-04 PROCEDURE — 88175 CYTOPATH C/V AUTO FLUID REDO: CPT

## 2019-04-04 RX ORDER — ERGOCALCIFEROL 1.25 MG/1
50000 CAPSULE ORAL
Qty: 12 CAP | Refills: 0 | Status: SHIPPED | OUTPATIENT
Start: 2019-04-04 | End: 2020-07-27

## 2019-04-04 RX ORDER — TEMAZEPAM 7.5 MG/1
7.5 CAPSULE ORAL NIGHTLY PRN
Qty: 30 CAP | Refills: 0 | Status: SHIPPED | OUTPATIENT
Start: 2019-04-04 | End: 2019-04-08 | Stop reason: SDUPTHER

## 2019-04-04 NOTE — PROGRESS NOTES
Subjective:   CC: pap, WWE     HPI:     Tiffany Milligan is a 39 y.o. female who is an established patient of the clinic, presents with the following concerns:     , sexually active with male partner for 3 years  Contraception: OCP  She is interested in having children.  Hx of STD: no  Hx of abnormal pap: no  Patient's last menstrual period was 2019 (approximate)., regular, normal flow, minimal cramping.   Denies fever, chills, pelvic pain, dyspareunia, abnormal vaginal bleeding/discharge, genital rash.   Denies nipple bleeding, discharge, breast mass, familial/personal hx of breast/gyn malignancy.   Last mammogram: NA.   Her mother has history of breast cancer and passed away in 2018 at age 66. Her mother tested negative for BRCA gene.     The patient was seen in clinic on 3/22/2019 for persistent cough with voice changes and currently has a referral to ENT and Pulmonology. She continues to have shortness of breath that occurs when speaking for long periods of time, but reports of relief with Advair. Her cough is improving with Mucinex, but no relief with Tessalon.     The patient continues to have insomnia with difficulty falling asleep at night ever since her mother passed away in 2018 from breast cancer. She was prescribed Ambien, however states her symptoms worsened with Ambien. She has tried OTC melatonin, Z-quil, Unisom, and Tylenol PM with minimal relief of her symptoms.     Recent A1C was 5.8 concerning for prediabetes. There is history of diabetes in father's side of family. She admits to eating a lot of sweet foods, but does not drink soda or eat excessive pasta.     Current medicines (including changes today)  Current Outpatient Prescriptions   Medication Sig Dispense Refill   • temazepam (RESTORIL) 7.5 MG capsule Take 1 Cap by mouth at bedtime as needed for Sleep for up to 30 days. 30 Cap 0   • vitamin D, Ergocalciferol, (DRISDOL) 34822 units Cap capsule Take 1 Cap by mouth every  "7 days. 12 Cap 0   • fluticasone-salmeterol (ADVAIR DISKUS) 250-50 MCG/DOSE AEROSOL POWDER, BREATH ACTIVATED Inhale 1 Puff by mouth every 12 hours. 1 Inhaler 2   • cetirizine-psuedoephedrine (ZYRTEC-D) 5-120 MG per tablet Take  by mouth.     • albuterol 108 (90 Base) MCG/ACT Aero Soln inhalation aerosol TAKE 1-2 PUFFS BY MOUTH EVERY 6 HOURS AS NEEDED FOR SHORTNESS OF BREATH  2   • albuterol 108 (90 Base) MCG/ACT Aero Soln inhalation aerosol TAKE 1-2 PUFFS BY MOUTH EVERY 6 HOURS AS NEEDED FOR SHORTNESS OF BREATH 8.5 Inhaler 2   • ipratropium (ATROVENT) 0.02 % Solution INHALE 1 VIAL VIA NEBULIZER EVERY 4 HOURS 62.5 mL 1   • DAYSEE 0.15-0.03 &0.01 MG Tab TAKE 1 TABLET BY MOUTH EVERY DAY 91 Tab 0   • cetirizine (ZYRTEC) 10 MG Tab Take 10 mg by mouth every day.     • omeprazole (PRILOSEC) 20 MG delayed-release capsule Take 20 mg by mouth every day.       No current facility-administered medications for this visit.      She  has a past medical history of Asthma; Colitis; Gout; Heart burn; History of cholecystectomy (1/9/2013); and Proctitis (12/23/2016).    I personally reviewed patient's problem list, allergies, medications, family hx, social hx with patient and update EPIC.     REVIEW OF SYSTEMS:  CONSTITUTIONAL:  Denies night sweats, fatigue, malaise, lethargy, fever or chills.  RESPIRATORY:  Denies cough, wheeze, hemoptysis, or shortness of breath.  CARDIOVASCULAR:  Denies chest pains, palpitations, pedal edema     Objective:     /72 (BP Location: Right arm, Patient Position: Sitting, BP Cuff Size: Adult)   Pulse 69   Temp 36.3 °C (97.4 °F) (Temporal)   Ht 1.6 m (5' 3\")   Wt 70.8 kg (156 lb)   SpO2 96%  Body mass index is 27.63 kg/m².    Physical Exam:  Constitutional: awake, alert, in no distress.  Skin: Warm, dry, good turgor, no rashes, bruises, ulcers in visible areas.  Neck: Trachea midline, no masses, no thyromegaly. No cervical or supraclavicular lymphadenopathy  Respiratory: Unlabored respiratory " effort, lungs clear to auscultation, no wheezes, no rales.  Cardiovascular: Normal S1, S2, no murmur, no pedal edema.  Psych: Oriented x3, affect and mood wnl, intact judgement and insight.   GYN: Unremarkable external genitalia. Negative abnormal vaginal discharge or bleeding, no vaginal rash, cervix in mid position, no concerning lesions on cervix, no cervical motion tenderness, uterus mid position with normal size & shape, no adnexal fullness/mass appreciated on bimanual exam.     Assessment and Plan:   The following treatment plan was discussed    1. Pap smear, low-risk  - THINPREP PAP W/HPV AND CTNG; Future    2. Routine cervical smear  - THINPREP PAP W/HPV AND CTNG; Future    3. Special screening examination for human papillomavirus (HPV)  - THINPREP PAP W/HPV AND CTNG; Future    4. Well woman exam  - pt is counseled on diet, exercise, vitamin supplement, mental health, sleep, stress  - discussed screening guidelines for pap, STD, mammogram, colonoscopy and vaccine recommendations.    - HEMOGLOBIN A1C; Future  - Lipid Profile; Future  - VITAMIN D,25 HYDROXY; Future  - Comp Metabolic Panel; Future    5. Adjustment insomnia  Acute insomnia secondary to her mother's death. Tried Ambien, OTC melatonin, Z-quil, Unisom, and Tylenol PM with minimal relief of her symptoms.   - temazepam (RESTORIL) 7.5 MG capsule; Take 1 Cap by mouth at bedtime as needed for Sleep for up to 30 days.  Dispense: 30 Cap; Refill: 0  - regular exercise, well-balanced diet, multi-vitamin daily, meditation, stress reduction.   - Avoid excessive consumption of stimulants, alcohol, illicit drugs, tobacco  - Avoid using computer or electronic devices at night  - Avoid watching TV on bed  - Avoid napping during the day  - Risks, benefits, side effects, as well as potential health complications associated with BDZ discussed with patient.     6. Screen for STD (sexually transmitted disease)  - GC/Chlamydia with pap sample  - HIV AG/AB COMBO ASSAY  SCREENING; Future  - T.PALLIDUM AB EIA; Future    7. Prediabetes  - Pt was counseled on dietary modification, weight loss   - Recommended moderate intensity exercise at least 30 minutes per day x 5 days per week.  - Follow up in 6 months.     8. Vitamin D deficiency  - vitamin D, Ergocalciferol, (DRISDOL) 68289 units Cap capsule; Take 1 Cap by mouth every 7 days.  Dispense: 12 Cap; Refill: 0    9. Mixed hyperlipidemia  Chronic, low 10-year CVD risks, recommended lifestyle modification and weight loss.   - Discussed dietary modification, exercise, and weight loss.     Yuridia Zelaya M.D.    Followup: Return for As needed.    Please note that this dictation was created using voice recognition software and/or scribes. I have made every reasonable attempt to correct obvious errors, but I expect that there are errors of grammar and possibly content that I did not discover before finalizing the note.     IBernie (Elsyibdaniel), am scribing for, and in the presence of, Yuridia Zelaya M.D.    Electronically signed by: Bernie Baez (Boy), 4/4/2019    IYuridia M.D. personally performed the services described in this documentation, as scribed by Bernie Baez in my presence, and it is both accurate and complete.

## 2019-04-05 DIAGNOSIS — Z11.51 SPECIAL SCREENING EXAMINATION FOR HUMAN PAPILLOMAVIRUS (HPV): ICD-10-CM

## 2019-04-05 DIAGNOSIS — Z12.4 ROUTINE CERVICAL SMEAR: ICD-10-CM

## 2019-04-05 DIAGNOSIS — Z01.419 PAP SMEAR, LOW-RISK: ICD-10-CM

## 2019-04-05 LAB
C TRACH DNA GENITAL QL NAA+PROBE: NEGATIVE
CYTOLOGY REG CYTOL: ABNORMAL
HPV HR 12 DNA CVX QL NAA+PROBE: POSITIVE
HPV16 DNA SPEC QL NAA+PROBE: NEGATIVE
HPV18 DNA SPEC QL NAA+PROBE: NEGATIVE
N GONORRHOEA DNA GENITAL QL NAA+PROBE: NEGATIVE
SPECIMEN SOURCE: ABNORMAL
SPECIMEN SOURCE: ABNORMAL

## 2019-04-08 DIAGNOSIS — F51.02 ADJUSTMENT INSOMNIA: ICD-10-CM

## 2019-04-08 PROBLEM — B97.7 HPV IN FEMALE: Status: ACTIVE | Noted: 2019-04-08

## 2019-04-08 RX ORDER — TEMAZEPAM 15 MG/1
15 CAPSULE ORAL NIGHTLY PRN
Qty: 30 CAP | Refills: 0 | Status: SHIPPED
Start: 2019-04-08 | End: 2019-05-08

## 2019-04-19 ENCOUNTER — APPOINTMENT (OUTPATIENT)
Dept: PULMONOLOGY | Facility: MEDICAL CENTER | Age: 39
End: 2019-04-19
Attending: FAMILY MEDICINE
Payer: COMMERCIAL

## 2019-04-27 DIAGNOSIS — Z30.011 ENCOUNTER FOR INITIAL PRESCRIPTION OF CONTRACEPTIVE PILLS: ICD-10-CM

## 2019-04-30 RX ORDER — LEVONORGESTREL AND ETHINYL ESTRADIOL 150-30(84)
KIT ORAL
Qty: 90 TAB | Refills: 3 | Status: SHIPPED | OUTPATIENT
Start: 2019-04-30 | End: 2020-04-16

## 2019-05-02 ENCOUNTER — OFFICE VISIT (OUTPATIENT)
Dept: MEDICAL GROUP | Age: 39
End: 2019-05-02
Payer: COMMERCIAL

## 2019-05-02 ENCOUNTER — HOSPITAL ENCOUNTER (OUTPATIENT)
Dept: LAB | Facility: MEDICAL CENTER | Age: 39
End: 2019-05-02
Attending: FAMILY MEDICINE
Payer: COMMERCIAL

## 2019-05-02 VITALS
BODY MASS INDEX: 27.64 KG/M2 | DIASTOLIC BLOOD PRESSURE: 64 MMHG | SYSTOLIC BLOOD PRESSURE: 118 MMHG | HEIGHT: 63 IN | HEART RATE: 79 BPM | WEIGHT: 156 LBS | TEMPERATURE: 98.5 F | OXYGEN SATURATION: 96 %

## 2019-05-02 DIAGNOSIS — D17.0 LIPOMA OF SCALP: ICD-10-CM

## 2019-05-02 DIAGNOSIS — R22.0 SCALP MASS: ICD-10-CM

## 2019-05-02 LAB — PATHOLOGY CONSULT NOTE: NORMAL

## 2019-05-02 PROCEDURE — 88304 TISSUE EXAM BY PATHOLOGIST: CPT

## 2019-05-02 PROCEDURE — 11422 EXC H-F-NK-SP B9+MARG 1.1-2: CPT | Performed by: FAMILY MEDICINE

## 2019-05-02 NOTE — PROGRESS NOTES
Subjective:   CC: scalp mass     HPI:     Tiffany Milligan is a 39 y.o. female who is an established patient of the clinic, presents with the following concerns:     Pt c/o tender, enlarging mass on the right lateral scalp. The lesion has been present for quite some time. It causes minor discomfort with hair brushing. It can also become erythematous and more tender at time. She was referred to dermatology in the past. Surgical excision was recommended, but pt has been very busy so she has not scheduled. She presents today to have the lesion removed.       Current medicines (including changes today)  Current Outpatient Prescriptions   Medication Sig Dispense Refill   • DAYSEE 0.15-0.03 &0.01 MG Tab TAKE 1 TABLET BY MOUTH EVERY DAY 90 Tab 3   • vitamin D, Ergocalciferol, (DRISDOL) 23723 units Cap capsule Take 1 Cap by mouth every 7 days. 12 Cap 0   • cetirizine (ZYRTEC) 10 MG Tab Take 10 mg by mouth every day.     • omeprazole (PRILOSEC) 20 MG delayed-release capsule Take 20 mg by mouth every day.     • temazepam (RESTORIL) 15 MG Cap Take 1 Cap by mouth at bedtime as needed for Sleep for up to 30 days. (Patient not taking: Reported on 5/2/2019) 30 Cap 0   • fluticasone-salmeterol (ADVAIR DISKUS) 250-50 MCG/DOSE AEROSOL POWDER, BREATH ACTIVATED Inhale 1 Puff by mouth every 12 hours. (Patient not taking: Reported on 5/2/2019) 1 Inhaler 2   • cetirizine-psuedoephedrine (ZYRTEC-D) 5-120 MG per tablet Take  by mouth.     • albuterol 108 (90 Base) MCG/ACT Aero Soln inhalation aerosol TAKE 1-2 PUFFS BY MOUTH EVERY 6 HOURS AS NEEDED FOR SHORTNESS OF BREATH  2   • albuterol 108 (90 Base) MCG/ACT Aero Soln inhalation aerosol TAKE 1-2 PUFFS BY MOUTH EVERY 6 HOURS AS NEEDED FOR SHORTNESS OF BREATH 8.5 Inhaler 2   • ipratropium (ATROVENT) 0.02 % Solution INHALE 1 VIAL VIA NEBULIZER EVERY 4 HOURS 62.5 mL 1     No current facility-administered medications for this visit.      She  has a past medical history of Asthma; Colitis; Gout;  "Heart burn; History of cholecystectomy (1/9/2013); and Proctitis (12/23/2016).    I personally reviewed patient's problem list, allergies, medications, family hx, social hx with patient and update EPIC.     REVIEW OF SYSTEMS:  CONSTITUTIONAL:  Denies night sweats, fatigue, malaise, lethargy, fever or chills.  RESPIRATORY:  Denies cough, wheeze, hemoptysis, or shortness of breath.  CARDIOVASCULAR:  Denies chest pains, palpitations, pedal edema     Objective:     /64   Pulse 79   Temp 36.9 °C (98.5 °F)   Ht 1.6 m (5' 3\")   Wt 70.8 kg (156 lb)   SpO2 96%  Body mass index is 27.63 kg/m².    Physical Exam:  Constitutional: awake, alert, in no distress.  Skin: Warm, dry, good turgor, no rashes, bruises, ulcers in visible areas.   -1.5 cm x  2 cm, mildly tender, soft, mildly erythematous mass over right parietal scalp.   Psych: Oriented x3, affect and mood wnl, intact judgement and insight.     1. Lipoma of the scalp  Tender, mildly erythematous, soft mass at the right parietal scalp noted on exam. The lesion is 2x1.5 cm.   - excision, see procedure note below.   - Pathology Specimen; Future       Lipoma excision   Indication: Lipoma of the scalp.   Location of lesion to be excised: Right Parietal scalp.    The benefits, alternatives and risks (including bleeding and infection) of the procedure and verbal informed consent obtained.  Local anesthesia was performed with Lidocaine 2% without epinephrine (total 2cc). Patient is prepped with povidone iodine, and draped in the usual sterile fashion.  1.5 cm incision was made in linear fashion across the lesion. The lesion was gentle dissected from surrounding. Whitish, fatty-like content was expressed with gentle pressure. Some whitish fluid was also noted. Hemostasis was achieved with direct pressure application.  The wound was closed with 3-0 Ethilon using simple interrupted stitches (x4). The wound was dressed by MA.  The specimen was sent for pathologic " examination.    The patient tolerated the procedure well and without apparent complications. The patient was instructed to keep the wound dry and covered for 24-48h and clean thereafter, and warning signs of infection were reviewed.  Will return for suture removal in 7-10 days.  Recommended that the patient use OTC analgesics as needed for pain.  Follow-up sooner for any concerns.     Followup: Return in about 10 days (around 5/12/2019) for Suture removal.    Please note that this dictation was created using voice recognition software and/or scribes. I have made every reasonable attempt to correct obvious errors, but I expect that there are errors of grammar and possibly content that I did not discover before finalizing the note.     Kevin CHAUDHARY (Scribe), am scribing for, and in the presence of, Yuridia Zelaya M.D.    Electronically signed by: Kevin Akins (Scribe), 5/2/2019    Yuridia CHAUDHARY M.D. personally performed the services described in this documentation, as scribed by Kevin Akins in my presence, and it is both accurate and complete.

## 2019-05-13 ENCOUNTER — NON-PROVIDER VISIT (OUTPATIENT)
Dept: MEDICAL GROUP | Age: 39
End: 2019-05-13
Payer: COMMERCIAL

## 2019-05-14 NOTE — PROGRESS NOTES
Tiffany Milligan is a 39 y.o. female here for a Non-Provider Visit for Suture Removal.    Sutures were placed by Dr. Zelaya on date: 5-9-19  Skin is healed: Yes  Provider notified if skin is not healed, or if there is redness, heat, pain, or drainage from incision: Skin Healed  Sutures removed.   Mastisol and steristips are placed: Yes    Advised to use emollient (vaseline, aquaphor, etc.) as needed, avoid peroxide and antibiotic ointment to reduce irritation.     Path report has not been reviewed by provider.  Path report has not been reviewed with patient.

## 2019-09-23 DIAGNOSIS — J45.30 REACTIVE AIRWAY DISEASE, MILD PERSISTENT, UNCOMPLICATED: ICD-10-CM

## 2019-09-23 RX ORDER — ALBUTEROL SULFATE 90 UG/1
AEROSOL, METERED RESPIRATORY (INHALATION)
Qty: 8.5 INHALER | Refills: 2 | Status: SHIPPED | OUTPATIENT
Start: 2019-09-23 | End: 2020-10-12

## 2020-01-24 DIAGNOSIS — Z00.00 PE (PHYSICAL EXAM), ANNUAL: ICD-10-CM

## 2020-01-24 DIAGNOSIS — J45.20 MILD INTERMITTENT REACTIVE AIRWAY DISEASE WITHOUT COMPLICATION: ICD-10-CM

## 2020-01-27 PROBLEM — L72.3 SEBACEOUS CYST: Status: RESOLVED | Noted: 2017-03-23 | Resolved: 2020-01-27

## 2020-01-27 PROBLEM — Z30.011 ENCOUNTER FOR INITIAL PRESCRIPTION OF CONTRACEPTIVE PILLS: Status: RESOLVED | Noted: 2017-03-23 | Resolved: 2020-01-27

## 2020-01-27 NOTE — TELEPHONE ENCOUNTER
Please schedule appointment for repeat Pap smear.  Please advise patient to do fasting blood tests prior to office visit.  Thank you

## 2020-01-27 NOTE — TELEPHONE ENCOUNTER
Phone Number Called: [phone] (home)     Call outcome: left message for patient to call back regarding message below    Message: lvm to c/b 1st attempt

## 2020-01-28 NOTE — TELEPHONE ENCOUNTER
Phone Number Called: [phone] (home)     Call outcome: left message for patient to call back regarding message below    Message: 2nd attempt

## 2020-01-29 NOTE — TELEPHONE ENCOUNTER
Phone Number Called: [phone] (home)     Call outcome: Did not leave a detailed message. Requested patient to call back.    Message: 3rd attempt letter mailed

## 2020-04-16 DIAGNOSIS — Z30.011 ENCOUNTER FOR INITIAL PRESCRIPTION OF CONTRACEPTIVE PILLS: ICD-10-CM

## 2020-04-16 RX ORDER — LEVONORGESTREL AND ETHINYL ESTRADIOL 150-30(84)
KIT ORAL
Qty: 91 TAB | Refills: 0 | Status: SHIPPED | OUTPATIENT
Start: 2020-04-16 | End: 2020-07-15 | Stop reason: SDUPTHER

## 2020-07-02 ENCOUNTER — TELEMEDICINE (OUTPATIENT)
Dept: MEDICAL GROUP | Age: 40
End: 2020-07-02
Payer: COMMERCIAL

## 2020-07-02 VITALS — WEIGHT: 146 LBS | TEMPERATURE: 97.6 F | BODY MASS INDEX: 26.87 KG/M2 | HEIGHT: 62 IN

## 2020-07-02 DIAGNOSIS — R73.03 PREDIABETES: ICD-10-CM

## 2020-07-02 DIAGNOSIS — K21.9 GASTROESOPHAGEAL REFLUX DISEASE WITHOUT ESOPHAGITIS: ICD-10-CM

## 2020-07-02 DIAGNOSIS — B97.7 HPV IN FEMALE: ICD-10-CM

## 2020-07-02 DIAGNOSIS — E55.9 VITAMIN D DEFICIENCY: ICD-10-CM

## 2020-07-02 DIAGNOSIS — N93.9 ABNORMAL VAGINAL BLEEDING: ICD-10-CM

## 2020-07-02 DIAGNOSIS — Z83.3 FHX: DIABETES MELLITUS: ICD-10-CM

## 2020-07-02 DIAGNOSIS — Z00.00 PE (PHYSICAL EXAM), ANNUAL: ICD-10-CM

## 2020-07-02 DIAGNOSIS — Z12.31 ENCOUNTER FOR SCREENING MAMMOGRAM FOR BREAST CANCER: ICD-10-CM

## 2020-07-02 DIAGNOSIS — J45.20 MILD INTERMITTENT REACTIVE AIRWAY DISEASE WITHOUT COMPLICATION: ICD-10-CM

## 2020-07-02 DIAGNOSIS — E78.00 PURE HYPERCHOLESTEROLEMIA: ICD-10-CM

## 2020-07-02 PROBLEM — N63.0 BREAST MASS IN FEMALE: Status: RESOLVED | Noted: 2017-01-07 | Resolved: 2020-07-02

## 2020-07-02 PROCEDURE — 99213 OFFICE O/P EST LOW 20 MIN: CPT | Mod: 25,95,CR | Performed by: FAMILY MEDICINE

## 2020-07-02 PROCEDURE — 99396 PREV VISIT EST AGE 40-64: CPT | Mod: 95,CR | Performed by: FAMILY MEDICINE

## 2020-07-02 SDOH — HEALTH STABILITY: MENTAL HEALTH: HOW OFTEN DO YOU HAVE A DRINK CONTAINING ALCOHOL?: 2-4 TIMES A MONTH

## 2020-07-02 ASSESSMENT — FIBROSIS 4 INDEX: FIB4 SCORE: 0.3

## 2020-07-02 NOTE — PROGRESS NOTES
Telemedicine Visit: Established Patient     This encounter was conducted via [employer].   Verbal consent was obtained. Patient's identity was verified.    Subjective:   CC: annual PE, abnormal vaginal bleeding.   Tiffany Milligan is a 40 y.o. female presenting for evaluation and management of:    Pt has hx of vitamin D deficiency. She was on high dose vitamin D supplement 50k/wk for 3 months. She is now taking 2000 units of vitamin D daily.     She also has hx of prediabetes, mild HLD. She is working on diet and lifestyle changes to control both condition.  She is taking albuterol as needed for reactive airway disease.  GERD is under good control with omeprazole 20 mg daily.    Patient was found to have positive HPV per Pap smear done in 2019.  She is due for repeat Pap smear April this year.  Due to the pandemic, she is not able to schedule the appointment for Pap.  She is doing well, denies any pelvic symptoms except for acute abnormal vaginal bleeding as below.    Acute concerns:   Patient was in normal state of health until approximately 45 days ago when she began to have heavy vaginal bleeding.  Heavy vaginal bleeding stops after a few days.  She then has daily vaginal spotting mostly in the afternoon.  Over the past 7 days, her menstruation starting to become more heavy.  She denies any dizziness with standing, exercise intolerance, chest pain, shortness of breath, dyspnea on exertion.  Symptoms do not interfere with her daily activity.  Patient is G0, P0, sexually active.       ROS   Denies any recent fevers or chills. No nausea or vomiting. No chest pains or shortness of breath.     Allergies   Allergen Reactions   • Ranitidine      2003-04-21;abd pain       Current medicines (including changes today)  Current Outpatient Medications   Medication Sig Dispense Refill   • DAYSEE 0.15-0.03 &0.01 MG Tab TAKE 1 TABLET BY MOUTH EVERY DAY 91 Tab 0   • ipratropium (ATROVENT) 0.02 % Solution INHALE 1 VIAL VIA NEBULIZER  "EVERY 4 HOURS as needed 62.5 mL 0   • albuterol 108 (90 Base) MCG/ACT Aero Soln inhalation aerosol TAKE 1-2 PUFFS BY MOUTH EVERY 6 HOURS AS NEEDED FOR SHORTNESS OF BREATH 8.5 Inhaler 2   • vitamin D, Ergocalciferol, (DRISDOL) 26159 units Cap capsule Take 1 Cap by mouth every 7 days. 12 Cap 0   • cetirizine (ZYRTEC) 10 MG Tab Take 10 mg by mouth every day.     • omeprazole (PRILOSEC) 20 MG delayed-release capsule Take 20 mg by mouth every day.       No current facility-administered medications for this visit.        Patient Active Problem List    Diagnosis Date Noted   • Prediabetes 07/02/2020   • Pure hypercholesterolemia 07/02/2020   • FHx: diabetes mellitus 07/02/2020   • Vitamin D deficiency 07/02/2020   • HPV in female, repeat Pap in April 2020 04/08/2019   • Family history of malignant neoplasm of breast in first degree relative 01/12/2016   • Vocal cord dysfunction 01/09/2013   • Reactive airway disease 05/23/2008   • Gastroesophageal reflux disease 04/30/2004       Family History   Problem Relation Age of Onset   • Cancer Mother 40        did have genetic testing, neg   • Hypertension Mother    • Hyperlipidemia Mother    • Hypertension Father    • Hyperlipidemia Father    • No Known Problems Sister    • No Known Problems Brother        She  has a past medical history of Asthma, Colitis, Gout, Heart burn, History of cholecystectomy (1/9/2013), and Proctitis (12/23/2016).  She  has a past surgical history that includes cholecystectomy (2009).       Objective:   Temp 36.4 °C (97.6 °F) (Oral) Comment: pt stated  Ht 1.575 m (5' 2\") Comment: pt stated  Wt 66.2 kg (146 lb) Comment: pt stated  BMI 26.70 kg/m²     Physical Exam:  Constitutional: Alert, no distress, well-groomed.  Skin: No rashes in visible areas.  Eye: Round. Conjunctiva clear, lids normal. No icterus.   ENMT: Lips pink without lesions, good dentition, moist mucous membranes. Phonation normal.  Neck: No masses, no thyromegaly. Moves freely without " pain.  CV: Pulse as reported by patient  Respiratory: Unlabored respiratory effort, no cough or audible wheeze  Psych: Alert and oriented x3, normal affect and mood.       Assessment and Plan:   The following treatment plan was discussed:     1. HPV in female, repeat Pap in April 2020  Patient was found to have negative cytology and positive HPV per Pap smear done in April 2019.  She is due for repeat Pap smear in April 2020.  We will schedule the appointment.    2. Prediabetes  3. FHx: diabetes mellitus  - Pt was counseled on dietary modification, weight loss   - Recommended moderate intensity exercise at least 30 minutes per day x 5 days per week.  - HEMOGLOBIN A1C; Future    4. Pure hypercholesterolemia  Chronic, low 10-year CVD risk, recommended dietary and lifestyle modification, brief dietary counseling provided, will continue to monitor.    - Lipid Profile; Future    5. Vitamin D deficiency  - cont 2000 units of vitamin D daily  - VITAMIN D,25 HYDROXY; Future    6. Gastroesophageal reflux disease without esophagitis  Chronic, controlled with omeprazole 20 mg daily, no s/e reported, will continue.   -Appropriate counseling regarding GERD discussed with patient. Topic might include: weight loss, avoid loose fitting, elevated the head of the bed, avoid common food triggers (fatty or fried foods, tomato sauce, alcohol, chocolate, mint, garlic, onion, and caffeine), smoking cessation, avoid excessive alcohol consumption. Increased risks of vitamin/mineral deficiency, GI infection, and bone loss associated with PPI was also discussed.       7. Mild intermittent reactive airway disease without complication  Controlled with albuterol PRN    8. Encounter for screening mammogram for breast cancer  - MA-SCREENING MAMMO BILAT W/CAD; Future    9. PE (physical exam), annual  General counseling provided, topics might include: diet, exercise, vitamin supplement, mental health, sleep, stress management, pap, mammogram,  colonoscopy and vaccine recommendations.        10. Abnormal vaginal bleeding  - CBC WITH DIFFERENTIAL; Future  - Comp Metabolic Panel; Future  - TSH WITH REFLEX TO FT4; Future  - HCG QUAL SERUM; Future  - US-PELVIC TRANSVAGINAL ONLY; Future   - neg s/s of anemia.   - ER precautions discussed.   - f/u in 4 weeks.       Follow-up: Return in about 4 weeks (around 7/30/2020) for Pap.

## 2020-07-15 DIAGNOSIS — Z30.011 ENCOUNTER FOR INITIAL PRESCRIPTION OF CONTRACEPTIVE PILLS: ICD-10-CM

## 2020-07-16 RX ORDER — LEVONORGESTREL / ETHINYL ESTRADIOL AND ETHINYL ESTRADIOL 150-30(84)
1 KIT ORAL
Qty: 91 TAB | Refills: 3 | Status: SHIPPED | OUTPATIENT
Start: 2020-07-16 | End: 2020-07-31

## 2020-07-18 ENCOUNTER — HOSPITAL ENCOUNTER (OUTPATIENT)
Dept: LAB | Facility: MEDICAL CENTER | Age: 40
End: 2020-07-18
Attending: FAMILY MEDICINE
Payer: COMMERCIAL

## 2020-07-18 DIAGNOSIS — E78.00 PURE HYPERCHOLESTEROLEMIA: ICD-10-CM

## 2020-07-18 DIAGNOSIS — N93.9 ABNORMAL VAGINAL BLEEDING: ICD-10-CM

## 2020-07-18 DIAGNOSIS — E55.9 VITAMIN D DEFICIENCY: ICD-10-CM

## 2020-07-18 DIAGNOSIS — R73.03 PREDIABETES: ICD-10-CM

## 2020-07-18 LAB
25(OH)D3 SERPL-MCNC: 61 NG/ML (ref 30–100)
ALBUMIN SERPL BCP-MCNC: 4.6 G/DL (ref 3.2–4.9)
ALBUMIN/GLOB SERPL: 1.5 G/DL
ALP SERPL-CCNC: 48 U/L (ref 30–99)
ALT SERPL-CCNC: 23 U/L (ref 2–50)
ANION GAP SERPL CALC-SCNC: 14 MMOL/L (ref 7–16)
AST SERPL-CCNC: 22 U/L (ref 12–45)
BASOPHILS # BLD AUTO: 0.9 % (ref 0–1.8)
BASOPHILS # BLD: 0.06 K/UL (ref 0–0.12)
BILIRUB SERPL-MCNC: 0.5 MG/DL (ref 0.1–1.5)
BUN SERPL-MCNC: 13 MG/DL (ref 8–22)
CALCIUM SERPL-MCNC: 9.5 MG/DL (ref 8.5–10.5)
CHLORIDE SERPL-SCNC: 104 MMOL/L (ref 96–112)
CHOLEST SERPL-MCNC: 190 MG/DL (ref 100–199)
CO2 SERPL-SCNC: 21 MMOL/L (ref 20–33)
CREAT SERPL-MCNC: 0.67 MG/DL (ref 0.5–1.4)
EOSINOPHIL # BLD AUTO: 0.09 K/UL (ref 0–0.51)
EOSINOPHIL NFR BLD: 1.4 % (ref 0–6.9)
ERYTHROCYTE [DISTWIDTH] IN BLOOD BY AUTOMATED COUNT: 45.9 FL (ref 35.9–50)
EST. AVERAGE GLUCOSE BLD GHB EST-MCNC: 103 MG/DL
FASTING STATUS PATIENT QL REPORTED: NORMAL
GLOBULIN SER CALC-MCNC: 3.1 G/DL (ref 1.9–3.5)
GLUCOSE SERPL-MCNC: 79 MG/DL (ref 65–99)
HBA1C MFR BLD: 5.2 % (ref 0–5.6)
HCG SERPL QL: NEGATIVE
HCT VFR BLD AUTO: 43.4 % (ref 37–47)
HDLC SERPL-MCNC: 70 MG/DL
HGB BLD-MCNC: 14.3 G/DL (ref 12–16)
IMM GRANULOCYTES # BLD AUTO: 0.01 K/UL (ref 0–0.11)
IMM GRANULOCYTES NFR BLD AUTO: 0.2 % (ref 0–0.9)
LDLC SERPL CALC-MCNC: 98 MG/DL
LYMPHOCYTES # BLD AUTO: 2.79 K/UL (ref 1–4.8)
LYMPHOCYTES NFR BLD: 43.1 % (ref 22–41)
MCH RBC QN AUTO: 30.2 PG (ref 27–33)
MCHC RBC AUTO-ENTMCNC: 32.9 G/DL (ref 33.6–35)
MCV RBC AUTO: 91.6 FL (ref 81.4–97.8)
MONOCYTES # BLD AUTO: 0.37 K/UL (ref 0–0.85)
MONOCYTES NFR BLD AUTO: 5.7 % (ref 0–13.4)
NEUTROPHILS # BLD AUTO: 3.16 K/UL (ref 2–7.15)
NEUTROPHILS NFR BLD: 48.7 % (ref 44–72)
NRBC # BLD AUTO: 0 K/UL
NRBC BLD-RTO: 0 /100 WBC
PLATELET # BLD AUTO: 386 K/UL (ref 164–446)
PMV BLD AUTO: 9.1 FL (ref 9–12.9)
POTASSIUM SERPL-SCNC: 4.3 MMOL/L (ref 3.6–5.5)
PROT SERPL-MCNC: 7.7 G/DL (ref 6–8.2)
RBC # BLD AUTO: 4.74 M/UL (ref 4.2–5.4)
SODIUM SERPL-SCNC: 139 MMOL/L (ref 135–145)
TRIGL SERPL-MCNC: 108 MG/DL (ref 0–149)
TSH SERPL DL<=0.005 MIU/L-ACNC: 2.56 UIU/ML (ref 0.38–5.33)
WBC # BLD AUTO: 6.5 K/UL (ref 4.8–10.8)

## 2020-07-18 PROCEDURE — 85025 COMPLETE CBC W/AUTO DIFF WBC: CPT

## 2020-07-18 PROCEDURE — 84703 CHORIONIC GONADOTROPIN ASSAY: CPT

## 2020-07-18 PROCEDURE — 83036 HEMOGLOBIN GLYCOSYLATED A1C: CPT

## 2020-07-18 PROCEDURE — 80053 COMPREHEN METABOLIC PANEL: CPT

## 2020-07-18 PROCEDURE — 82306 VITAMIN D 25 HYDROXY: CPT

## 2020-07-18 PROCEDURE — 84443 ASSAY THYROID STIM HORMONE: CPT

## 2020-07-18 PROCEDURE — 36415 COLL VENOUS BLD VENIPUNCTURE: CPT

## 2020-07-18 PROCEDURE — 80061 LIPID PANEL: CPT

## 2020-07-20 ENCOUNTER — HOSPITAL ENCOUNTER (OUTPATIENT)
Dept: RADIOLOGY | Facility: MEDICAL CENTER | Age: 40
End: 2020-07-20
Attending: FAMILY MEDICINE
Payer: COMMERCIAL

## 2020-07-20 DIAGNOSIS — N93.9 ABNORMAL VAGINAL BLEEDING: ICD-10-CM

## 2020-07-20 DIAGNOSIS — Z12.31 ENCOUNTER FOR SCREENING MAMMOGRAM FOR BREAST CANCER: ICD-10-CM

## 2020-07-20 PROCEDURE — 76830 TRANSVAGINAL US NON-OB: CPT

## 2020-07-20 PROCEDURE — 77067 SCR MAMMO BI INCL CAD: CPT

## 2020-07-27 ENCOUNTER — OFFICE VISIT (OUTPATIENT)
Dept: MEDICAL GROUP | Age: 40
End: 2020-07-27
Payer: COMMERCIAL

## 2020-07-27 ENCOUNTER — HOSPITAL ENCOUNTER (OUTPATIENT)
Facility: MEDICAL CENTER | Age: 40
End: 2020-07-27
Attending: FAMILY MEDICINE
Payer: COMMERCIAL

## 2020-07-27 VITALS
OXYGEN SATURATION: 95 % | SYSTOLIC BLOOD PRESSURE: 116 MMHG | BODY MASS INDEX: 28.89 KG/M2 | WEIGHT: 157 LBS | HEIGHT: 62 IN | TEMPERATURE: 98.4 F | DIASTOLIC BLOOD PRESSURE: 74 MMHG | HEART RATE: 98 BPM

## 2020-07-27 DIAGNOSIS — E55.9 VITAMIN D DEFICIENCY: ICD-10-CM

## 2020-07-27 DIAGNOSIS — Z01.419 WELL WOMAN EXAM: ICD-10-CM

## 2020-07-27 DIAGNOSIS — Z01.419 ENCOUNTER FOR WELL WOMAN EXAM WITH ROUTINE GYNECOLOGICAL EXAM: ICD-10-CM

## 2020-07-27 DIAGNOSIS — Z12.4 ROUTINE CERVICAL SMEAR: ICD-10-CM

## 2020-07-27 DIAGNOSIS — B97.7 HPV IN FEMALE: ICD-10-CM

## 2020-07-27 DIAGNOSIS — E78.00 PURE HYPERCHOLESTEROLEMIA: ICD-10-CM

## 2020-07-27 DIAGNOSIS — Z11.51 SPECIAL SCREENING EXAMINATION FOR HUMAN PAPILLOMAVIRUS (HPV): ICD-10-CM

## 2020-07-27 DIAGNOSIS — Z23 NEED FOR VACCINATION: ICD-10-CM

## 2020-07-27 DIAGNOSIS — N93.9 ABNORMAL UTERINE BLEEDING (AUB): ICD-10-CM

## 2020-07-27 DIAGNOSIS — R73.03 PREDIABETES: ICD-10-CM

## 2020-07-27 PROCEDURE — 99214 OFFICE O/P EST MOD 30 MIN: CPT | Mod: 25 | Performed by: FAMILY MEDICINE

## 2020-07-27 PROCEDURE — 88175 CYTOPATH C/V AUTO FLUID REDO: CPT

## 2020-07-27 PROCEDURE — 90471 IMMUNIZATION ADMIN: CPT | Performed by: FAMILY MEDICINE

## 2020-07-27 PROCEDURE — 87624 HPV HI-RISK TYP POOLED RSLT: CPT

## 2020-07-27 PROCEDURE — 99396 PREV VISIT EST AGE 40-64: CPT | Mod: 25 | Performed by: FAMILY MEDICINE

## 2020-07-27 PROCEDURE — 90651 9VHPV VACCINE 2/3 DOSE IM: CPT | Performed by: FAMILY MEDICINE

## 2020-07-27 PROCEDURE — 99000 SPECIMEN HANDLING OFFICE-LAB: CPT | Performed by: FAMILY MEDICINE

## 2020-07-27 RX ORDER — MULTIVIT-MIN/IRON/FOLIC ACID/K 18-600-40
2000 CAPSULE ORAL DAILY
COMMUNITY
End: 2021-02-17

## 2020-07-27 ASSESSMENT — FIBROSIS 4 INDEX: FIB4 SCORE: 0.48

## 2020-07-27 ASSESSMENT — PATIENT HEALTH QUESTIONNAIRE - PHQ9: CLINICAL INTERPRETATION OF PHQ2 SCORE: 0

## 2020-07-27 NOTE — PROGRESS NOTES
Subjective:   CC: WWE, pap     HPI:     Tiffany Milligan is a 40 y.o. female, established patient of the clinic, presents with the following concerns:     , sexually active with male partner.   Contraception: combined OCPs  Hx of STD: HPV positive   Hx of abnormal pap: positive HPV in 2019 with negative cytology  Patient's last menstrual period was 2020 (approximate).  Denies fever, chills, pelvic pain, dyspareunia, abnormal vaginal bleeding/discharge, genital rash.   Denies nipple bleeding, discharge, breast mass, familial/personal hx of breast/gyn malignancy.   Last mammogram: 2020, Finding: neg     Pt has been on [place] combined OCPs for 4 years. She was doing well until recently when she has prolonged vaginal spotting for almost 3 months. Her most recent CBC, CMP, thyroid function, and pregnancy test was normal. Her vaginal US in 2020 was notable for 1.7 cm fibroid at the posterior uterine body, no thicken of endometrial canal. Bilateral ovaries were unremarkable.     Current medicines (including changes today)  Current Outpatient Medications   Medication Sig Dispense Refill   • Cholecalciferol (VITAMIN D) 50 MCG (2000 UT) Cap Take 2,000 Units by mouth every day.     • norethindrone (AYGESTIN) 5 MG tablet Take 1 Tab by mouth every day for 10 days. 10 Tab 0   • Levonorgest-Eth Estrad 91-Day (DAYSEE) 0.15-0.03 &0.01 MG Tab Take 1 Tab by mouth every day. 91 Tab 3   • ipratropium (ATROVENT) 0.02 % Solution INHALE 1 VIAL VIA NEBULIZER EVERY 4 HOURS as needed 62.5 mL 0   • albuterol 108 (90 Base) MCG/ACT Aero Soln inhalation aerosol TAKE 1-2 PUFFS BY MOUTH EVERY 6 HOURS AS NEEDED FOR SHORTNESS OF BREATH 8.5 Inhaler 2   • cetirizine (ZYRTEC) 10 MG Tab Take 10 mg by mouth every day.     • omeprazole (PRILOSEC) 20 MG delayed-release capsule Take 20 mg by mouth every day.       No current facility-administered medications for this visit.      She  has a past medical history of Asthma, Colitis, Gout, Heart  "burn, History of cholecystectomy (1/9/2013), and Proctitis (12/23/2016).    I personally reviewed patient's problem list, allergies, medications, family hx, social hx with patient and update EPIC.     REVIEW OF SYSTEMS:  CONSTITUTIONAL:  Denies night sweats, fatigue, malaise, lethargy, fever or chills.  RESPIRATORY:  Denies cough, wheeze, hemoptysis, or shortness of breath.  CARDIOVASCULAR:  Denies chest pains, palpitations, pedal edema     Objective:     /74 (BP Location: Right arm, Patient Position: Sitting, BP Cuff Size: Adult)   Pulse 98   Temp 36.9 °C (98.4 °F) (Temporal)   Ht 1.575 m (5' 2\")   Wt 71.2 kg (157 lb)   SpO2 95%  Body mass index is 28.72 kg/m².    Physical Exam:  Constitutional: awake, alert, in no distress.  Skin: Warm, dry, good turgor, no rashes, bruises, ulcers in visible areas.  Neck: Trachea midline, no masses, no thyromegaly. No cervical or supraclavicular lymphadenopathy  Respiratory: Unlabored respiratory effort, lungs clear to auscultation, no wheezes, no rales.  Cardiovascular: Normal S1, S2, no murmur, no pedal edema.  Psych: Oriented x3, affect and mood wnl, intact judgement and insight.   GYN: Unremarkable external genitalia. Mild vaginal bleeding, no vaginal rash, cervix in mid position, no concerning lesions on cervix, no cervical motion tenderness, uterus mid position with normal size & shape, no adnexal fullness/mass appreciated on bimanual exam.     Assessment and Plan:   The following treatment plan was discussed    1. Encounter for well woman exam with routine gynecological exam  2. Routine cervical smear  3. Special screening examination for human papillomavirus (HPV)  4. HPV in female, repeat Pap in April 2020  - THINPREP PAP WITH HPV    5. Well woman exam  General counseling provided, topics might include: diet, exercise, vitamin supplement, mental health, sleep, stress management, pap, mammogram, colonoscopy and vaccine recommendations.      - CBC WITH " DIFFERENTIAL; Future  - Comp Metabolic Panel; Future  - HEMOGLOBIN A1C; Future  - Lipid Profile; Future    6. Need for vaccination  - Hepatitis B Vaccine Adult IM  - 9VHPV Vaccine 2-3 Dose IM  - 9VHPV Vaccine 2-3 Dose IM; Future  - 9VHPV Vaccine 2-3 Dose IM; Future    7. Pure hypercholesterolemia  Improving with diet and lifestyle modification.   She states that she lost 16 lbs but regains some because of the pandemic.     8. Vitamin D deficiency  Resolved with vitamin D supplement, 2000 units.   - cont Vitamin D, 2000 units/day    9. Prediabetes  Resolved with diet/lifestyle changes and weight loss     10. Abnormal uterine bleeding (AUB)  Acute vaginal spotting for the past 3 months, labs and US were normal. HCG was negative. Plans:   - norethindrone (AYGESTIN) 5 MG tablet; Take 1 Tab by mouth every day for 10 days.  Dispense: 10 Tab; Refill: 0      Ly MARY Zelaya M.D.      Followup: Return in about 1 year (around 7/27/2021) for annual PE.    Please note that this dictation was created using voice recognition software. I have made every reasonable attempt to correct obvious errors, but I expect that there are errors of grammar and possibly content that I did not discover before finalizing the note.

## 2020-07-28 LAB
CYTOLOGY REG CYTOL: NORMAL
HPV HR 12 DNA CVX QL NAA+PROBE: NEGATIVE
HPV16 DNA SPEC QL NAA+PROBE: NEGATIVE
HPV18 DNA SPEC QL NAA+PROBE: NEGATIVE
SPECIMEN SOURCE: NORMAL

## 2020-07-30 PROBLEM — Z86.19 HISTORY OF HPV INFECTION: Status: ACTIVE | Noted: 2019-04-08

## 2020-08-27 ENCOUNTER — NON-PROVIDER VISIT (OUTPATIENT)
Dept: MEDICAL GROUP | Age: 40
End: 2020-08-27
Payer: COMMERCIAL

## 2020-08-27 DIAGNOSIS — Z23 NEED FOR VACCINATION: ICD-10-CM

## 2020-08-27 DIAGNOSIS — Z30.42 DEPO-PROVERA CONTRACEPTIVE STATUS: ICD-10-CM

## 2020-08-27 PROCEDURE — 90651 9VHPV VACCINE 2/3 DOSE IM: CPT | Performed by: FAMILY MEDICINE

## 2020-08-27 PROCEDURE — 90472 IMMUNIZATION ADMIN EACH ADD: CPT | Performed by: FAMILY MEDICINE

## 2020-08-27 PROCEDURE — 90746 HEPB VACCINE 3 DOSE ADULT IM: CPT | Performed by: FAMILY MEDICINE

## 2020-08-27 PROCEDURE — 90471 IMMUNIZATION ADMIN: CPT | Performed by: FAMILY MEDICINE

## 2020-08-27 PROCEDURE — 96372 THER/PROPH/DIAG INJ SC/IM: CPT | Performed by: FAMILY MEDICINE

## 2020-08-27 RX ADMIN — MEDROXYPROGESTERONE ACETATE 150 MG: 150 INJECTION, SUSPENSION INTRAMUSCULAR at 08:46

## 2020-08-27 NOTE — NON-PROVIDER
"Tiffany Milligan is a 40 y.o. female here for a non-provider visit for:   HEPATITIS B 3 of 3    Reason for immunization: continue or complete series started at the office  Immunization records indicate need for vaccine: Yes, confirmed with Epic  Minimum interval has been met for this vaccine: Yes  ABN completed: Not Indicated    Order and dose verified by: Pamela PINON Dated   was given to patient: Yes  All IAC Questionnaire questions were answered \"No.\"    Patient tolerated injection and no adverse effects were observed or reported: Yes    Pt scheduled for next dose in series: No      Tiffany Milligan is a 40 y.o. female here for a non-provider visit for:   HPV 2 of 3    Reason for immunization: continue or complete series started at the office  Immunization records indicate need for vaccine: Yes, confirmed with Epic  Minimum interval has been met for this vaccine: Yes  ABN completed: Not Indicated    Order and dose verified by: Pamela PINON Dated   was given to patient: Yes  All IAC Questionnaire questions were answered \"No.\"    Patient tolerated injection and no adverse effects were observed or reported: Yes    Pt scheduled for next dose in series: Yes       Tiffany Milligan is a 40 y.o. here for a Depo Provera Injection.     Date of last Depo Provera Injection: none  Current date within therapeutic range?: Yes   Urine pregnancy test done (needed if out of date range): yes--negative  Date of office visit: 7/27/20  Date of last pap (if > 21 years old)/ GYN exam: 7/27/20  Dx: Contraceptive use    Order and dose verified by: Pamela  Patient tolerated injection and no adverse effects were observed or reported: Yes    # of Administrations remaining in MAR: 3  Next injection due between 11/12/20 and 11/26/20.        "

## 2020-09-14 DIAGNOSIS — J45.20 MILD INTERMITTENT REACTIVE AIRWAY DISEASE WITHOUT COMPLICATION: ICD-10-CM

## 2020-10-12 DIAGNOSIS — J45.30 REACTIVE AIRWAY DISEASE, MILD PERSISTENT, UNCOMPLICATED: ICD-10-CM

## 2020-10-12 RX ORDER — ALBUTEROL SULFATE 90 UG/1
AEROSOL, METERED RESPIRATORY (INHALATION)
Qty: 18 G | Refills: 2 | Status: SHIPPED | OUTPATIENT
Start: 2020-10-12 | End: 2022-04-05 | Stop reason: SDUPTHER

## 2020-11-12 ENCOUNTER — NON-PROVIDER VISIT (OUTPATIENT)
Dept: MEDICAL GROUP | Age: 40
End: 2020-11-12
Payer: COMMERCIAL

## 2020-11-12 PROCEDURE — 96372 THER/PROPH/DIAG INJ SC/IM: CPT | Performed by: FAMILY MEDICINE

## 2020-11-12 RX ADMIN — MEDROXYPROGESTERONE ACETATE 150 MG: 150 INJECTION, SUSPENSION INTRAMUSCULAR at 16:48

## 2020-11-13 NOTE — NON-PROVIDER
Tiffany Milligan is a 40 y.o. here for a Depo Provera Injection.     Date of last Depo Provera Injection: 8/27/20  Current date within therapeutic range?: Yes   Urine pregnancy test done (needed if out of date range): not performed  Date of office visit:7/27/20  Date of last pap (if > 21 years old)/ GYN exam: 7/27/20  Dx: Contraceptive use    Order and dose verified by: PRESTON  Patient tolerated injection and no adverse effects were observed or reported: Yes    # of Administrations remaining in MAR: 2  Next injection due between 1/28/21 and 2/11/21.

## 2021-01-27 ENCOUNTER — NON-PROVIDER VISIT (OUTPATIENT)
Dept: MEDICAL GROUP | Age: 41
End: 2021-01-27
Payer: COMMERCIAL

## 2021-01-27 ENCOUNTER — TELEPHONE (OUTPATIENT)
Dept: MEDICAL GROUP | Age: 41
End: 2021-01-27

## 2021-01-27 DIAGNOSIS — Z23 NEED FOR VACCINATION: ICD-10-CM

## 2021-01-27 PROCEDURE — 90472 IMMUNIZATION ADMIN EACH ADD: CPT | Performed by: FAMILY MEDICINE

## 2021-01-27 PROCEDURE — 90686 IIV4 VACC NO PRSV 0.5 ML IM: CPT | Performed by: FAMILY MEDICINE

## 2021-01-27 PROCEDURE — 90651 9VHPV VACCINE 2/3 DOSE IM: CPT | Performed by: FAMILY MEDICINE

## 2021-01-27 PROCEDURE — 90471 IMMUNIZATION ADMIN: CPT | Performed by: FAMILY MEDICINE

## 2021-01-27 PROCEDURE — 96372 THER/PROPH/DIAG INJ SC/IM: CPT | Performed by: FAMILY MEDICINE

## 2021-01-27 RX ADMIN — MEDROXYPROGESTERONE ACETATE 150 MG: 150 INJECTION, SUSPENSION INTRAMUSCULAR at 09:06

## 2021-01-27 NOTE — PROGRESS NOTES
"Tiffany Milligan is a 40 y.o. female here for a non-provider visit for:   FLU    Reason for immunization: Annual Flu Vaccine  Immunization records indicate need for vaccine: Yes, confirmed with Epic  Minimum interval has been met for this vaccine: Yes  ABN completed: Yes    Order and dose verified by: HAIM  VIS Dated  8/15/19 was given to patient: Yes  All IAC Questionnaire questions were answered \"No.\"    Patient tolerated injection and no adverse effects were observed or reported: Yes    Pt scheduled for next dose in series: No    Tiffany Milligan is a 40 y.o. female here for a non-provider visit for:   HPV 3 of 3    Reason for immunization: Overdue/Provider Recommended  Immunization records indicate need for vaccine: Yes, confirmed with Epic  Minimum interval has been met for this vaccine: Yes  ABN completed: Yes    Order and dose verified by: HAIM  VIS Dated  10/30/19 was given to patient: Yes  All IAC Questionnaire questions were answered \"No.\"    Patient tolerated injection and no adverse effects were observed or reported: Yes    Pt scheduled for next dose in series: No    Tiffany Milligan is a 40 y.o. here for a Depo Provera Injection.     Date of last Depo Provera Injection: 11/12/2020  Current date within therapeutic range?: Yes   Urine pregnancy test done (needed if out of date range): not needed  Date of office visit:1/27/21  Dx: Contraceptive use    Order and dose verified by: HAIM  Patient tolerated injection and no adverse effects were observed or reported: Yes    # of Administrations remaining in MAR: 1  Next injection due between 4/14/21 and 4/28/21.        "

## 2021-01-27 NOTE — TELEPHONE ENCOUNTER
2. SPECIFIC Action To Be Taken: Orders pending, please sign.    3. Diagnosis/Clinical Reason for Request: Need for Vaccine    4. Special[place] [facility] [place]. Is appointment scheduled for requested order/referral: yes - 1/27/21    Patient was informed they will receive a return phone call from the office ONLY if there are any questions before processing their request. Advised to call back if they haven't received a call from the referral department in 5 days.

## 2021-02-16 ENCOUNTER — TELEPHONE (OUTPATIENT)
Dept: URGENT CARE | Facility: PHYSICIAN GROUP | Age: 41
End: 2021-02-16

## 2021-02-16 ENCOUNTER — OFFICE VISIT (OUTPATIENT)
Dept: URGENT CARE | Facility: PHYSICIAN GROUP | Age: 41
End: 2021-02-16
Payer: COMMERCIAL

## 2021-02-16 VITALS
BODY MASS INDEX: 29.44 KG/M2 | HEART RATE: 94 BPM | RESPIRATION RATE: 14 BRPM | TEMPERATURE: 97.7 F | OXYGEN SATURATION: 96 % | WEIGHT: 160 LBS | HEIGHT: 62 IN

## 2021-02-16 DIAGNOSIS — S61.213A LACERATION OF LEFT MIDDLE FINGER WITHOUT FOREIGN BODY WITHOUT DAMAGE TO NAIL, INITIAL ENCOUNTER: ICD-10-CM

## 2021-02-16 DIAGNOSIS — T14.8XXA SOFT TISSUE AVULSION: ICD-10-CM

## 2021-02-16 PROCEDURE — 12001 RPR S/N/AX/GEN/TRNK 2.5CM/<: CPT | Mod: F2 | Performed by: PHYSICIAN ASSISTANT

## 2021-02-16 ASSESSMENT — FIBROSIS 4 INDEX: FIB4 SCORE: 0.49

## 2021-02-18 ASSESSMENT — ENCOUNTER SYMPTOMS
FOCAL WEAKNESS: 0
TINGLING: 0
SENSORY CHANGE: 0

## 2021-02-18 NOTE — PROGRESS NOTES
"Subjective:      Tiffany Milligan is a 41 y.o. female who presents with Finger Injury (L middle finger lac)    Pt PMH, SocHx, SurgHx, FamHx, Drug allergies and medications reviewed with pt/EPIC.      Family history reviewed, it is not pertinent to this complaint.           Patient presents with:  Finger Injury: L middle finger laceration that occurred prior to arrival.  Patient states she was opening a champagne bottle, trying to get the cord to come out and sliced her finger on the glass neck of the bottle.  Patient denies any other injury.  Patient is unsure of tetanus status but she believes it is up-to-date.  Patient denies numbness and tingling to finger.  Patient is right-handed.    Laceration   The incident occurred less than 1 hour ago. Pain location: Left middle finger. The laceration is 1 cm in size. The laceration mechanism was a broken glass. The pain is at a severity of 3/10. The pain is mild. The pain has been constant since onset. She reports no foreign bodies present. Her tetanus status is UTD.       Review of Systems   Musculoskeletal: Negative for joint pain.   Neurological: Negative for tingling, sensory change and focal weakness.   All other systems reviewed and are negative.         Objective:     Pulse 94   Temp 36.5 °C (97.7 °F) (Temporal)   Resp 14   Ht 1.575 m (5' 2\")   Wt 72.6 kg (160 lb)   SpO2 96%   BMI 29.26 kg/m²      Physical Exam  Vitals and nursing note reviewed.   Constitutional:       General: She is not in acute distress.     Appearance: Normal appearance. She is well-developed and normal weight. She is not ill-appearing or toxic-appearing.   HENT:      Head: Normocephalic and atraumatic.      Nose: Nose normal.      Mouth/Throat:      Lips: Pink.      Mouth: Mucous membranes are moist.   Eyes:      Extraocular Movements: Extraocular movements intact.      Conjunctiva/sclera: Conjunctivae normal.      Pupils: Pupils are equal, round, and reactive to light.   Cardiovascular:   "      Rate and Rhythm: Normal rate and regular rhythm.      Pulses: Normal pulses.      Heart sounds: Normal heart sounds.   Pulmonary:      Effort: Pulmonary effort is normal.   Abdominal:      General: Abdomen is flat.   Musculoskeletal:        Hands:       Cervical back: Normal range of motion and neck supple.   Skin:     General: Skin is warm and dry.      Capillary Refill: Capillary refill takes less than 2 seconds.   Neurological:      General: No focal deficit present.      Mental Status: She is alert and oriented to person, place, and time.   Psychiatric:         Mood and Affect: Mood normal.            Procedure: bleeding control-   -Risks including bleeding, nerve damage, infection, and poor cosmetic outcome discussed. Benefits and alternatives discussed.   -Clean technique with sterile instruments were used.  -Pressure dressing failed to stop bleeding, so silver nitrate was used to cauterize the wound edges.    -Bleeding stopped , bandage applied.   -Patient tolerated well           Assessment/Plan:         1. Laceration of left middle finger without foreign body without damage to nail, initial encounter      2. Soft tissue avulsion       Patient was evaluated in clinic today while wearing appropriate personal protective equipment.      Wound care supplies and instructions given to patient.     PT should follow up with PCP in 1-2 days for re-evaluation if symptoms have not improved.      Discussed red flags and reasons to return to UC or ED.      Pt and/or family verbalized understanding of diagnosis and follow up instructions and was offered informational handout on diagnosis.  PT discharged.

## 2021-04-19 ENCOUNTER — OFFICE VISIT (OUTPATIENT)
Dept: MEDICAL GROUP | Age: 41
End: 2021-04-19
Payer: COMMERCIAL

## 2021-04-19 ENCOUNTER — APPOINTMENT (OUTPATIENT)
Dept: MEDICAL GROUP | Age: 41
End: 2021-04-19
Payer: COMMERCIAL

## 2021-04-19 VITALS
WEIGHT: 164 LBS | TEMPERATURE: 98.6 F | OXYGEN SATURATION: 97 % | HEART RATE: 80 BPM | SYSTOLIC BLOOD PRESSURE: 142 MMHG | BODY MASS INDEX: 30.18 KG/M2 | HEIGHT: 62 IN | DIASTOLIC BLOOD PRESSURE: 74 MMHG

## 2021-04-19 DIAGNOSIS — R03.0 ELEVATED BLOOD PRESSURE READING: ICD-10-CM

## 2021-04-19 DIAGNOSIS — Z00.00 PE (PHYSICAL EXAM), ANNUAL: ICD-10-CM

## 2021-04-19 DIAGNOSIS — E66.9 OBESITY (BMI 30-39.9): Primary | ICD-10-CM

## 2021-04-19 DIAGNOSIS — N93.9 ABNORMAL UTERINE BLEEDING: ICD-10-CM

## 2021-04-19 LAB
INT CON NEG: NEGATIVE
INT CON POS: POSITIVE
POC URINE PREGNANCY TEST: NEGATIVE

## 2021-04-19 PROCEDURE — 99214 OFFICE O/P EST MOD 30 MIN: CPT | Performed by: FAMILY MEDICINE

## 2021-04-19 PROCEDURE — 81025 URINE PREGNANCY TEST: CPT | Performed by: FAMILY MEDICINE

## 2021-04-19 ASSESSMENT — FIBROSIS 4 INDEX: FIB4 SCORE: 0.49

## 2021-04-19 ASSESSMENT — PATIENT HEALTH QUESTIONNAIRE - PHQ9: CLINICAL INTERPRETATION OF PHQ2 SCORE: 0

## 2021-04-19 NOTE — PROGRESS NOTES
Subjective:   CC: abnormal uterine bleeding     HPI:     Tiffany Milligan is a 41 y.o. female, established patient of the clinic, presents with the following concerns:     Patient is G0, P0, sexually active, not interested in having children.  Patient was taking OCP for contraception in the past.  However, at previous office visit in July 2020, she complains of prolonged vaginal spotting.  CBC, CMP, thyroid function, and pregnancy test were normal.  Vaginal ultrasound was notable for 1.7 cm fibroid at the posterior urine body.  OCP was discontinued.  Patient was switched to Depo-Provera, which was helping with vaginal bleeding initially.  However, patient complains of abnormal, prolonged menstruation lasting for 6 weeks.  She denies any symptoms of anemia at this time.  Prolonged vaginal bleeding started on March 7, 2021 and stop approximately 3 days ago. She denies fever, chills, nausea, vomiting, pelvic pain, vaginal rash, dyspareunia, or any other urogenital symptoms.    Patient also complains of inability to lose weight over the past few months.  Patient states that she gained approximately 20 pounds during the pandemic.  Patient has been on strict Mediterranean diet.  She exercises 30 to 60 minutes every day at least 5 to 6 days/week. She is worried given family history of diabetes. She is interested in referral to weight loss program.         Current medicines (including changes today)  Current Outpatient Medications   Medication Sig Dispense Refill   • albuterol 108 (90 Base) MCG/ACT Aero Soln inhalation aerosol TAKE 1-2 PUFFS BY MOUTH EVERY 6 HOURS AS NEEDED FOR SHORTNESS OF BREATH 18 g 2   • ipratropium (ATROVENT) 0.02 % Solution INHALE 1 VIAL VIA NEBULIZER EVERY 4 HOURS 62.5 mL 1   • cetirizine (ZYRTEC) 10 MG Tab Take 10 mg by mouth every day.     • omeprazole (PRILOSEC) 20 MG delayed-release capsule Take 20 mg by mouth every day.       No current facility-administered medications for this visit.     She   "has a past medical history of Asthma, Colitis, Gout, Heart burn, History of cholecystectomy (2013), and Proctitis (2016).    I personally reviewed patient's problem list, allergies, medications, family hx, social hx with patient and update EPIC.     REVIEW OF SYSTEMS:  CONSTITUTIONAL:  Denies night sweats, fatigue, malaise, lethargy, fever or chills.  RESPIRATORY:  Denies cough, wheeze, hemoptysis, or shortness of breath.  CARDIOVASCULAR:  Denies chest pains, palpitations, pedal edema     Objective:     /74 (BP Location: Right arm, Patient Position: Sitting, BP Cuff Size: Adult)   Pulse 80   Temp 37 °C (98.6 °F) (Temporal)   Ht 1.575 m (5' 2\")   Wt 74.4 kg (164 lb)   SpO2 97%  Body mass index is 30 kg/m².    Physical Exam:  Constitutional: awake, alert, in no distress.  Skin: Warm, dry, good turgor, no rashes, bruises, ulcers in visible areas.  Eye: conjunctiva clear, lids neg for edema or lesions.  Neck: Trachea midline, no masses, no thyromegaly. No cervical or supraclavicular lymphadenopathy  Respiratory: Unlabored respiratory effort, lungs clear to auscultation, no wheezes, no rales.  Cardiovascular: Normal S1, S2, no murmur, no pedal edema.  Abdomen: Soft, non-tender to palpation, active BS, no hernia, no hepatosplenomegaly, negative rebound or guarding.   Psych: Oriented x3, affect and mood wnl, intact judgement and insight.       Assessment and Plan:   The following treatment plan was discussed    1. Obesity (BMI 30-39.9)  - Patient identified as having weight management issue.  Appropriate orders and counseling given.  - REFERRAL TO [facility] ([facility])    2. Abnormal uterine bleeding  , sexually active, has been suffering on and off abnormal uterine bleeding for 12+ months. She was on combined OCP, but c/o prolonged vaginal bleeding last summer. CBC, CMP, TFT were normal. Pelvis US showed 1.7 cm uterine fibroid. She is then switched to Depo-Provera, which was " helping initially but she recently develops acute prolonged vaginal bleeding for 6 weeks. Vaginal bleeding stopped 3 days ago. She denies symptoms of acute anemia. Urine pregnancy test done in clinic today was negative. I discussed treatment options for abnormal uterine bleeding. Pt is interested in Mirena, will fax order to her insurance for approval.   - CBC WITH DIFFERENTIAL; Future  - TSH WITH REFLEX TO FT4; Future  - FERRITIN; Future  - IRON/TOTAL IRON BIND; Future  - start OTC iron supplement, Vitron-C once daily  - hydration encouraged.    3. Elevated blood pressure reading  BP was 142/74 in clinic today. She has no known hx of hypertension.   Will recheck BP when she returns for Mirena insertion in 4-6 weeks.   If BP elevation is persistent, will start anti-hypertension.   She is referred to [facility] for weight loss.   She will cont diet and exercise.       Yuridia Zelaya M.D.      Followup: Return in about 4 weeks (around 5/17/2021) for Mirena insertion .    Please note that this dictation was created using voice recognition software. I have made every reasonable attempt to correct obvious errors, but I expect that there are errors of grammar and possibly content that I did not discover before finalizing the note.

## 2021-04-23 ENCOUNTER — OFFICE VISIT (OUTPATIENT)
Dept: MEDICAL GROUP | Age: 41
End: 2021-04-23
Payer: COMMERCIAL

## 2021-04-23 VITALS
DIASTOLIC BLOOD PRESSURE: 74 MMHG | WEIGHT: 161 LBS | HEART RATE: 82 BPM | TEMPERATURE: 98.4 F | SYSTOLIC BLOOD PRESSURE: 116 MMHG | BODY MASS INDEX: 29.63 KG/M2 | HEIGHT: 62 IN | OXYGEN SATURATION: 98 %

## 2021-04-23 DIAGNOSIS — Z30.430 ENCOUNTER FOR IUD INSERTION: ICD-10-CM

## 2021-04-23 PROBLEM — Z97.5 IUD (INTRAUTERINE DEVICE) IN PLACE: Status: ACTIVE | Noted: 2021-04-23

## 2021-04-23 LAB
INT CON NEG: NEGATIVE
INT CON POS: POSITIVE
POC URINE PREGNANCY TEST: NEGATIVE

## 2021-04-23 PROCEDURE — 58300 INSERT INTRAUTERINE DEVICE: CPT | Performed by: FAMILY MEDICINE

## 2021-04-23 PROCEDURE — 81025 URINE PREGNANCY TEST: CPT | Performed by: FAMILY MEDICINE

## 2021-04-23 ASSESSMENT — FIBROSIS 4 INDEX: FIB4 SCORE: 0.49

## 2021-04-24 NOTE — PROGRESS NOTES
Subjective:   CC: IUD insertion     HPI:     Tiffany Milligan is a 41 y.o. female, who was seen on 4/19/2021 for contraception counseling and abnormal uterine bleeding. Below is previous HPI:     Patient is G0, P0, sexually active, not interested in having children.  Patient was taking OCP for contraception in the past.  However, at previous office visit in July 2020, she complains of prolonged vaginal spotting.  CBC, CMP, thyroid function, and pregnancy test were normal.  Vaginal ultrasound was notable for 1.7 cm fibroid at the posterior urine body.  OCP was discontinued.  Patient was switched to Depo-Provera, which was helping with vaginal bleeding initially.  However, patient complains of abnormal, prolonged menstruation lasting for 6 weeks.  She denies any symptoms of anemia at this time.  Prolonged vaginal bleeding started on March 7, 2021 and stop approximately 3 days ago. She denies fever, chills, nausea, vomiting, pelvic pain, vaginal rash, dyspareunia, or any other urogenital symptoms.    Pt presents for Mirena insertion for contraception and abnormal uterine bleeding as mentioned above.        Current medicines (including changes today)  Current Outpatient Medications   Medication Sig Dispense Refill   • levonorgestrel (MIRENA, 52 MG,) 52 mg (20 mcg/24 hr) IUD 1 Each by Intrauterine route one time. Inserted on 4/23/2021     • albuterol 108 (90 Base) MCG/ACT Aero Soln inhalation aerosol TAKE 1-2 PUFFS BY MOUTH EVERY 6 HOURS AS NEEDED FOR SHORTNESS OF BREATH 18 g 2   • ipratropium (ATROVENT) 0.02 % Solution INHALE 1 VIAL VIA NEBULIZER EVERY 4 HOURS 62.5 mL 1   • cetirizine (ZYRTEC) 10 MG Tab Take 10 mg by mouth every day.     • omeprazole (PRILOSEC) 20 MG delayed-release capsule Take 20 mg by mouth every day.       No current facility-administered medications for this visit.     She  has a past medical history of Asthma, Colitis, Gout, Heart burn, History of cholecystectomy (1/9/2013), and Proctitis  "(2016).    I personally reviewed patient's problem list, allergies, medications, family hx, social hx with patient and update EPIC.     REVIEW OF SYSTEMS:  CONSTITUTIONAL:  Denies night sweats, fatigue, malaise, lethargy, fever or chills.  RESPIRATORY:  Denies cough, wheeze, hemoptysis, or shortness of breath.  CARDIOVASCULAR:  Denies chest pains, palpitations, pedal edema     Objective:     /74 (BP Location: Right arm, Patient Position: Sitting, BP Cuff Size: Adult)   Pulse 82   Temp 36.9 °C (98.4 °F) (Temporal)   Ht 1.575 m (5' 2\")   Wt 73 kg (161 lb)   SpO2 98%  Body mass index is 29.45 kg/m².    Physical Exam:  Constitutional: awake, alert, in no distress.  Skin: Warm, dry, good turgor, no rashes, bruises, ulcers in visible areas.  Eye: conjunctiva clear, lids neg for edema or lesions.  Psych: Oriented x3, affect and mood wnl, intact judgement and insight.   GYN: Unremarkable external genitalia. Negative abnormal vaginal discharge or bleeding, no vaginal rash, cervix in mid position, no concerning lesions on cervix, no cervical motion tenderness, uterus mid position with normal size & shape, no adnexal fullness/mass appreciated on bimanual exam.     Assessment and Plan:   The following treatment plan was discussed    1. Encounter for IUD insertion  - POCT Pregnancy: negative      IUD Procedure Note:  Procedure: Mirena insertion  Indication: Pt is , sexually active, who desires for long-term reversible contraception.   Current contraception: Depo-Provera  No LMP recorded.  Last pap: 2020  In-office urine pregnancy test was negative  Written PARQ obtained. Risks, benefits, and potential side effects discussed with patient. She consented to the procedure.     The patient was placed in the lithotomy position and bimanual exam was performed which confirmed that the uterus was not prohibitively anteflexed or retroflexed. A no touch technique was used throughout the procedure. A speculum was " placed into vagina and cervix was cleaned with betadine sticks x3. A tenaculum was placed at the superior lip of the cervix. A plastic sound was advanced through the external and internal os until it reached the fundus of the uterus, the depth was 6.5 cm. The sound was then withdrawn. The IUD was loaded in a sterile manner and advanced into position. The string was visualized and cut to 6 cm. Hemostasis was achieved with direct pressure. Patient tolerated the procedure well. No immediate complications were noted. She was instructed to use secondary protection (condoms, OCP, etc..) during the first 2 weeks after insertion. She was instructed to return in 2 weeks for string check. I advised her to return sooner if any fever, pelvic pain or abnormal discharge developed. She clearly verbalized understanding of these instructions. I instructed her to take ibuprofen prn pain or cramping and that her first few menstrual cycles may be heavier than usual. She was given a detailed instruction sheet about her IUD.  Pt was advised that IUD does not protect against STI's.        Yuridia Zelaya M.D.      Followup: Return in about 2 weeks (around 5/7/2021) for IUD string check.    Please note that this dictation was created using voice recognition software. I have made every reasonable attempt to correct obvious errors, but I expect that there are errors of grammar and possibly content that I did not discover before finalizing the note.

## 2021-05-04 ENCOUNTER — OFFICE VISIT (OUTPATIENT)
Dept: MEDICAL GROUP | Age: 41
End: 2021-05-04
Payer: COMMERCIAL

## 2021-05-04 VITALS
HEART RATE: 93 BPM | OXYGEN SATURATION: 94 % | BODY MASS INDEX: 29.44 KG/M2 | DIASTOLIC BLOOD PRESSURE: 72 MMHG | HEIGHT: 62 IN | TEMPERATURE: 99 F | WEIGHT: 160 LBS | SYSTOLIC BLOOD PRESSURE: 116 MMHG

## 2021-05-04 DIAGNOSIS — Z97.5 IUD (INTRAUTERINE DEVICE) IN PLACE: Primary | ICD-10-CM

## 2021-05-04 DIAGNOSIS — Z30.431 IUD CHECK UP: ICD-10-CM

## 2021-05-04 PROCEDURE — 99212 OFFICE O/P EST SF 10 MIN: CPT | Performed by: FAMILY MEDICINE

## 2021-05-04 ASSESSMENT — FIBROSIS 4 INDEX: FIB4 SCORE: 0.49

## 2021-05-04 NOTE — PROGRESS NOTES
"Subjective:   CC: IUD checkup     HPI:     Tiffany Milligan is a 41 y.o. female, established patient of the clinic, presents with the following concerns:     , sexually active, had Mirena inserted on 2021 to control abnormal vaginal bleeding. Pt tolerates the device well. She has daily vaginal spotting but otherwise no other systemic or urogenital symptoms. She has not had her 1st menses following the insertion. She has not attempted intercourse as her partner is out of town.     Current medicines (including changes today)  Current Outpatient Medications   Medication Sig Dispense Refill   • levonorgestrel (MIRENA, 52 MG,) 52 mg (20 mcg/24 hr) IUD 1 Each by Intrauterine route one time. Inserted on 2021     • albuterol 108 (90 Base) MCG/ACT Aero Soln inhalation aerosol TAKE 1-2 PUFFS BY MOUTH EVERY 6 HOURS AS NEEDED FOR SHORTNESS OF BREATH 18 g 2   • ipratropium (ATROVENT) 0.02 % Solution INHALE 1 VIAL VIA NEBULIZER EVERY 4 HOURS 62.5 mL 1   • cetirizine (ZYRTEC) 10 MG Tab Take 10 mg by mouth every day.     • omeprazole (PRILOSEC) 20 MG delayed-release capsule Take 20 mg by mouth every day.       No current facility-administered medications for this visit.     She  has a past medical history of Asthma, Colitis, Gout, Heart burn, History of cholecystectomy (2013), and Proctitis (2016).    I personally reviewed patient's problem list, allergies, medications, family hx, social hx with patient and update EPIC.     REVIEW OF SYSTEMS:  CONSTITUTIONAL:  Denies night sweats, fatigue, malaise, lethargy, fever or chills.  RESPIRATORY:  Denies cough, wheeze, hemoptysis, or shortness of breath.  CARDIOVASCULAR:  Denies chest pains, palpitations, pedal edema     Objective:     /72 (BP Location: Right arm, Patient Position: Sitting, BP Cuff Size: Adult)   Pulse 93   Temp 37.2 °C (99 °F) (Temporal)   Ht 1.575 m (5' 2\")   Wt 72.6 kg (160 lb)   SpO2 94%  Body mass index is 29.26 kg/m².    Physical " Exam:  Constitutional: awake, alert, in no distress.  Skin: Warm, dry, good turgor, no rashes, bruises, ulcers in visible areas.  Eye: conjunctiva clear, lids neg for edema or lesions.  GYN: Unremarkable external genitalia. Negative abnormal vaginal discharge, mild vaginal bleeding, no vaginal rash, cervix in mid position, no concerning lesions on cervix, no cervical motion tenderness, uterus mid position with normal size & shape, no adnexal fullness/mass appreciated on bimanual exam.   - IUD strings visible from external os.   Psych: Oriented x3, affect and mood wnl, intact judgement and insight.       Assessment and Plan:   The following treatment plan was discussed    1. IUD (intrauterine device) in place  2. IUD check up  Doing well following IUD insertion on 4/23/2021.   Mild daily spotting is expected.   Appropriate counseling provided.   F/u PRN.       Yuridia Zelaya M.D.      Followup: Return for As needed.    Please note that this dictation was created using voice recognition software. I have made every reasonable attempt to correct obvious errors, but I expect that there are errors of grammar and possibly content that I did not discover before finalizing the note.

## 2022-04-05 ENCOUNTER — PATIENT MESSAGE (OUTPATIENT)
Dept: MEDICAL GROUP | Age: 42
End: 2022-04-05
Payer: COMMERCIAL

## 2022-04-05 DIAGNOSIS — Z12.31 ENCOUNTER FOR SCREENING MAMMOGRAM FOR BREAST CANCER: ICD-10-CM

## 2022-04-05 DIAGNOSIS — J45.30 REACTIVE AIRWAY DISEASE, MILD PERSISTENT, UNCOMPLICATED: ICD-10-CM

## 2022-04-05 DIAGNOSIS — J45.20 MILD INTERMITTENT REACTIVE AIRWAY DISEASE WITHOUT COMPLICATION: ICD-10-CM

## 2022-04-05 NOTE — TELEPHONE ENCOUNTER
Phone Number Called: [phone] (home) [phone] (work)      Call outcome: Did not leave a detailed message. Requested patient to call back.    Message: LVM 1st attempt

## 2022-04-05 NOTE — LETTER
April 7, 2022        To Tiffany Milligan:    Brandyn. We have attempted to reach you by phone. We would like to set up an office visit either online via [employer] or an in-office visit. Please call us back at [phone] to schedule.    Thank you,            Rhiannon Lal, Med Ass't

## 2022-04-05 NOTE — TELEPHONE ENCOUNTER
From: Tiffany Milligan  To: Physician Yuridia Zelaya  Sent: 4/5/2022 10:06 AM PDT  Subject: Refills Request    Hello,    I am trying to refill 2 prescriptions with [facility] and the site shows unavailable for Albuterol and Ipratropium. How can I get these two items refilled?    Thank you,   Tiffany

## 2022-04-05 NOTE — PATIENT COMMUNICATION
Please schedule appointment for aPE and medication refills. Please advise pt to do fasting blood tests prior to next OV.     Please advise patient to call [phone] to schedule appointment for mammogram.    Yuridia Zelaya M.D.

## 2022-04-07 RX ORDER — ALBUTEROL SULFATE 90 UG/1
AEROSOL, METERED RESPIRATORY (INHALATION)
Qty: 18 G | Refills: 0 | Status: SHIPPED | OUTPATIENT
Start: 2022-04-07 | End: 2022-07-14

## 2022-07-26 ENCOUNTER — OFFICE VISIT (OUTPATIENT)
Dept: URGENT CARE | Facility: PHYSICIAN GROUP | Age: 42
End: 2022-07-26
Payer: COMMERCIAL

## 2022-07-26 ENCOUNTER — HOSPITAL ENCOUNTER (OUTPATIENT)
Dept: RADIOLOGY | Facility: MEDICAL CENTER | Age: 42
End: 2022-07-26
Attending: PHYSICIAN ASSISTANT
Payer: COMMERCIAL

## 2022-07-26 ENCOUNTER — HOSPITAL ENCOUNTER (OUTPATIENT)
Facility: MEDICAL CENTER | Age: 42
End: 2022-07-26
Attending: PHYSICIAN ASSISTANT
Payer: COMMERCIAL

## 2022-07-26 VITALS
WEIGHT: 162 LBS | SYSTOLIC BLOOD PRESSURE: 130 MMHG | OXYGEN SATURATION: 93 % | TEMPERATURE: 98.2 F | BODY MASS INDEX: 29.81 KG/M2 | RESPIRATION RATE: 16 BRPM | HEIGHT: 62 IN | DIASTOLIC BLOOD PRESSURE: 88 MMHG | HEART RATE: 84 BPM

## 2022-07-26 DIAGNOSIS — J04.0 LARYNGITIS: ICD-10-CM

## 2022-07-26 DIAGNOSIS — J06.9 URI WITH COUGH AND CONGESTION: ICD-10-CM

## 2022-07-26 DIAGNOSIS — R07.89 CHEST TIGHTNESS: ICD-10-CM

## 2022-07-26 DIAGNOSIS — J45.901 ASTHMA WITH ACUTE EXACERBATION, UNSPECIFIED ASTHMA SEVERITY, UNSPECIFIED WHETHER PERSISTENT: ICD-10-CM

## 2022-07-26 LAB
INT CON NEG: NEGATIVE
INT CON POS: POSITIVE
S PYO AG THROAT QL: NEGATIVE

## 2022-07-26 PROCEDURE — 71046 X-RAY EXAM CHEST 2 VIEWS: CPT

## 2022-07-26 PROCEDURE — 99214 OFFICE O/P EST MOD 30 MIN: CPT | Performed by: PHYSICIAN ASSISTANT

## 2022-07-26 PROCEDURE — U0005 INFEC AGEN DETEC AMPLI PROBE: HCPCS

## 2022-07-26 PROCEDURE — U0003 INFECTIOUS AGENT DETECTION BY NUCLEIC ACID (DNA OR RNA); SEVERE ACUTE RESPIRATORY SYNDROME CORONAVIRUS 2 (SARS-COV-2) (CORONAVIRUS DISEASE [COVID-19]), AMPLIFIED PROBE TECHNIQUE, MAKING USE OF HIGH THROUGHPUT TECHNOLOGIES AS DESCRIBED BY CMS-2020-01-R: HCPCS

## 2022-07-26 PROCEDURE — 87880 STREP A ASSAY W/OPTIC: CPT | Performed by: PHYSICIAN ASSISTANT

## 2022-07-26 RX ORDER — PREDNISONE 20 MG/1
TABLET ORAL
Qty: 8 TABLET | Refills: 0 | Status: SHIPPED | OUTPATIENT
Start: 2022-07-26 | End: 2024-01-22

## 2022-07-26 ASSESSMENT — ENCOUNTER SYMPTOMS
HEADACHES: 1
SORE THROAT: 0
MYALGIAS: 0
FEVER: 0
ABDOMINAL PAIN: 0
SHORTNESS OF BREATH: 1
DIZZINESS: 0
SINUS PAIN: 1
NAUSEA: 0
DIARRHEA: 0
CHILLS: 0
COUGH: 1
VOMITING: 0

## 2022-07-26 NOTE — PROGRESS NOTES
Subjective     Tiffany Milligan is a 42 y.o. female who presents with Pharyngitis and URI (/)    HPI:  Tiffany Milligan is a 42 y.o. female who presents today for evaluation of hoarse voice and shortness of breath.  Patient reports that on Friday she started to notice that her voice was hoarse.  She was not having any sore throat.  She felt fine otherwise.  She was in [place] and thought it was due to the fact that there was little bit more humidity in the area.  She returned home yesterday, however, and started to develop some shortness of breath, chest tightness, and mild cough.  She has asthma and typically uses albuterol inhaler and she has ipratropium solution for nebulizer machine.  States that she has been doing both of those but has not had any significant improvement in her chest tightness/shortness of breath.  She has not had any fever/chills, sore throat, increased nasal congestion.  No sick contacts that she is aware of.  She does have a remote history of pneumonia in the past and is concerned about that.        Review of Systems   Constitutional: Positive for malaise/fatigue. Negative for chills and fever.   HENT: Positive for congestion and sinus pain. Negative for sore throat.    Respiratory: Positive for cough and shortness of breath.    Gastrointestinal: Negative for abdominal pain, diarrhea, nausea and vomiting.   Musculoskeletal: Negative for myalgias.   Neurological: Positive for headaches. Negative for dizziness.           PMH:  has a past medical history of Asthma, Colitis, Gout, Heart burn, History of cholecystectomy (1/9/2013), and Proctitis (12/23/2016).  MEDS:   Current Outpatient Medications:   •  albuterol 108 (90 Base) MCG/ACT Aero Soln inhalation aerosol, INHALE 1-2 PUFFS BY MOUTH EVERY 6 HOURS AS NEEDED FOR SHORTNESS OF BREATH, Disp: 18 Each, Rfl: 0  •  ipratropium (ATROVENT) 0.02 % Solution, INHALE 1 VIAL VIA NEBULIZER EVERY 4 HOURS, Disp: 25 mL, Rfl: 0  •  levonorgestrel (MIRENA, 52  "MG,) 52 mg (20 mcg/24 hr) IUD, 1 Each by Intrauterine route one time. Inserted on 4/23/2021, Disp: , Rfl:   •  cetirizine (ZYRTEC) 10 MG Tab, Take 10 mg by mouth every day., Disp: , Rfl:   •  omeprazole (PRILOSEC) 20 MG delayed-release capsule, Take 20 mg by mouth every day., Disp: , Rfl:   ALLERGIES:   Allergies   Allergen Reactions   • Ranitidine      2003-04-21;abd pain     SURGHX:   Past Surgical History:   Procedure Laterality Date   • CHOLECYSTECTOMY  2009     SOCHX:  reports that she has never smoked. She has never used smokeless tobacco. She reports current alcohol use of about 0.6 oz of alcohol per week. She reports that she does not use drugs.  FH: Family history was reviewed, no pertinent findings to report      Objective     /88 (BP Location: Left arm, Patient Position: Sitting, BP Cuff Size: Adult)   Pulse 84   Temp 36.8 °C (98.2 °F) (Temporal)   Resp 16   Ht 1.575 m (5' 2\")   Wt 73.5 kg (162 lb)   LMP 07/05/2022   SpO2 93%   Breastfeeding No   BMI 29.63 kg/m²      Physical Exam  Constitutional:       Appearance: She is well-developed.   HENT:      Head: Normocephalic and atraumatic.      Right Ear: Hearing, tympanic membrane, ear canal and external ear normal.      Left Ear: Hearing, tympanic membrane, ear canal and external ear normal.      Nose: Mucosal edema and congestion present. No rhinorrhea.      Mouth/Throat:      Lips: Pink.      Mouth: Mucous membranes are moist.      Pharynx: Uvula midline. Posterior oropharyngeal erythema present. No oropharyngeal exudate or uvula swelling.      Tonsils: Tonsillar exudate present. No tonsillar abscesses. 1+ on the right. 1+ on the left.   Eyes:      Conjunctiva/sclera: Conjunctivae normal.      Pupils: Pupils are equal, round, and reactive to light.   Cardiovascular:      Rate and Rhythm: Normal rate and regular rhythm.      Heart sounds: Normal heart sounds. No murmur heard.  Pulmonary:      Effort: Pulmonary effort is normal.      Breath " sounds: Normal breath sounds. No decreased breath sounds, wheezing, rhonchi or rales.   Musculoskeletal:      Cervical back: Normal range of motion.   Lymphadenopathy:      Cervical: No cervical adenopathy.   Skin:     General: Skin is warm and dry.      Capillary Refill: Capillary refill takes less than 2 seconds.   Neurological:      Mental Status: She is alert and oriented to person, place, and time.   Psychiatric:         Behavior: Behavior normal.         Judgment: Judgment normal.         POCT Rapid Strep A - Negative    DX-CHEST-2 VIEWS  FINDINGS:     LUNGS: Clear. No effusions.  PNEUMOTHORAX: None.  LINES AND TUBES: None.  MEDIASTINUM: No cardiomegaly.  MUSCULOSKELETAL STRUCTURES: No acute displaced fracture.  Cholecystectomy.     IMPRESSION:  No acute cardiopulmonary abnormality.      *X-rays were reviewed and interpreted independently by me. I agree with the radiologist's findings     Assessment & Plan     1. Laryngitis  - SARS-CoV-2, PCR (In-House); Future  - POCT Rapid Strep A  - predniSONE (DELTASONE) 20 MG Tab; Take 2 tabs once daily for 4 days  Dispense: 8 Tablet; Refill: 0    2. Chest tightness  - DX-CHEST-2 VIEWS; Future  - SARS-CoV-2, PCR (In-House); Future  - predniSONE (DELTASONE) 20 MG Tab; Take 2 tabs once daily for 4 days  Dispense: 8 Tablet; Refill: 0    3. URI with cough and congestion  - DX-CHEST-2 VIEWS; Future  - SARS-CoV-2, PCR (In-House); Future  - OTC cold/flu medications  - PO fluids  - Rest  - Tylenol or ibuprofen as needed for fever > 100.4 F    4. Asthma with acute exacerbation, unspecified asthma severity, unspecified whether persistent  - predniSONE (DELTASONE) 20 MG Tab; Take 2 tabs once daily for 4 days  Dispense: 8 Tablet; Refill: 0      *Patient had a nasal swab to test for COVID-19 virus.  Patient was advised to stay home and self isolate/self quarantine while awaiting the results.  Supportive care was reiterated.  Return/ER precautions discussed.         No wheezing  auscultated on exam but patient is having chest tightness not being significantly relieved by her albuterol.  Short course of prednisone sent to pharmacy to help with symptoms.        Differential Diagnosis, natural history, and supportive care discussed. Return to the Urgent Care or follow up with your PCP if symptoms fail to resolve, or for any new or worsening symptoms. Emergency room precautions discussed. Patient and/or family appears understanding of information.

## 2022-07-27 LAB
COVID ORDER STATUS COVID19: NORMAL
SARS-COV-2 RNA RESP QL NAA+PROBE: NOTDETECTED
SPECIMEN SOURCE: NORMAL

## 2022-10-22 DIAGNOSIS — J45.30 REACTIVE AIRWAY DISEASE, MILD PERSISTENT, UNCOMPLICATED: ICD-10-CM

## 2022-10-23 RX ORDER — ALBUTEROL SULFATE 90 UG/1
AEROSOL, METERED RESPIRATORY (INHALATION)
Qty: 6.7 EACH | Refills: 0 | Status: SHIPPED | OUTPATIENT
Start: 2022-10-23 | End: 2022-11-28

## 2022-11-24 DIAGNOSIS — Z11.59 NEED FOR HEPATITIS C SCREENING TEST: ICD-10-CM

## 2022-11-24 DIAGNOSIS — J45.30 REACTIVE AIRWAY DISEASE, MILD PERSISTENT, UNCOMPLICATED: ICD-10-CM

## 2022-11-28 RX ORDER — ALBUTEROL SULFATE 90 UG/1
AEROSOL, METERED RESPIRATORY (INHALATION)
Qty: 6.7 EACH | Refills: 0 | Status: SHIPPED | OUTPATIENT
Start: 2022-11-28 | End: 2024-01-04 | Stop reason: SDUPTHER

## 2022-11-28 NOTE — TELEPHONE ENCOUNTER
Phone Number Called: [phone] (home) [phone] (work)      Call outcome: Did not leave a detailed message. Requested patient to call back.    Message: LVM to schedule appt

## 2022-11-28 NOTE — TELEPHONE ENCOUNTER
Received request via: Pharmacy    Was the patient seen in the last year in this department? No    Does the patient have an active prescription (recently filled or refills available) for medication(s) requested? No    Does the patient have care home Plus and need 100 day supply (blood pressure, diabetes and cholesterol meds only)? Patient does not have SCP

## 2022-11-28 NOTE — TELEPHONE ENCOUNTER
Please schedule appointment for aPE and medication refills. Please advise pt to do fasting blood tests prior to next OV.   Yuridia Zelaya M.D.

## 2022-12-02 NOTE — TELEPHONE ENCOUNTER
Phone Number Called: [phone] (home) [phone] (work)      Call outcome: Spoke to patient regarding message below.    Message: LVM2 informing pt to schedule appt and to have fasting blood tests done before next visit.

## 2023-09-23 ENCOUNTER — OFFICE VISIT (OUTPATIENT)
Dept: URGENT CARE | Facility: PHYSICIAN GROUP | Age: 43
End: 2023-09-23
Payer: COMMERCIAL

## 2023-09-23 VITALS
OXYGEN SATURATION: 96 % | BODY MASS INDEX: 29.23 KG/M2 | DIASTOLIC BLOOD PRESSURE: 82 MMHG | HEIGHT: 63 IN | WEIGHT: 165 LBS | HEART RATE: 99 BPM | SYSTOLIC BLOOD PRESSURE: 126 MMHG | RESPIRATION RATE: 16 BRPM | TEMPERATURE: 98.4 F

## 2023-09-23 DIAGNOSIS — J01.40 ACUTE NON-RECURRENT PANSINUSITIS: ICD-10-CM

## 2023-09-23 DIAGNOSIS — H66.002 NON-RECURRENT ACUTE SUPPURATIVE OTITIS MEDIA OF LEFT EAR WITHOUT SPONTANEOUS RUPTURE OF TYMPANIC MEMBRANE: ICD-10-CM

## 2023-09-23 DIAGNOSIS — J31.0 RHINITIS MEDICAMENTOSA: ICD-10-CM

## 2023-09-23 DIAGNOSIS — T48.5X5A RHINITIS MEDICAMENTOSA: ICD-10-CM

## 2023-09-23 PROCEDURE — 99213 OFFICE O/P EST LOW 20 MIN: CPT | Performed by: NURSE PRACTITIONER

## 2023-09-23 PROCEDURE — 3074F SYST BP LT 130 MM HG: CPT | Performed by: NURSE PRACTITIONER

## 2023-09-23 PROCEDURE — 3079F DIAST BP 80-89 MM HG: CPT | Performed by: NURSE PRACTITIONER

## 2023-09-23 RX ORDER — AMOXICILLIN AND CLAVULANATE POTASSIUM 875; 125 MG/1; MG/1
1 TABLET, FILM COATED ORAL 2 TIMES DAILY
Qty: 20 TABLET | Refills: 0 | Status: SHIPPED | OUTPATIENT
Start: 2023-09-23 | End: 2023-10-03

## 2023-09-23 RX ORDER — PREDNISONE 20 MG/1
40 TABLET ORAL DAILY
Qty: 10 TABLET | Refills: 0 | Status: SHIPPED | OUTPATIENT
Start: 2023-09-23 | End: 2023-09-28

## 2023-09-23 RX ORDER — FLUTICASONE PROPIONATE 50 MCG
1 SPRAY, SUSPENSION (ML) NASAL DAILY
Qty: 16 G | Refills: 0 | Status: SHIPPED | OUTPATIENT
Start: 2023-09-23

## 2023-09-23 NOTE — PROGRESS NOTES
Date: 09/23/23     Chief Complaint:    Chief Complaint   Patient presents with    Sinus Problem     X9days. Extreme congestion, difficulty breathing through nose, ears are plugged        History of Present Illness: 43 y.o. female  presents presents to clinic with 9 days of sinus congestion pain pressure unable to breathe out of her nose and full plugged ears.  Patient has attempted multiple over-the-counter medications to help alleviate her symptoms.  She has also been using Afrin daily for the past 9 days.  She states that the Afrin does mildly help her breathe out of her nose.  She denies any shortness of breath chest pain or leg swelling.  No nausea vomiting diarrhea.    ROS:  As stated in HPI       Medical/SX/ Social History:  Reviewed per chart    Medications:    Current Outpatient Medications on File Prior to Visit   Medication Sig Dispense Refill    albuterol 108 (90 Base) MCG/ACT Aero Soln inhalation aerosol INHALE 1-2 PUFFS BY MOUTH EVERY 6 HOURS AS NEEDED FOR SHORTNESS OF BREATH. 6.7 Each 0    ipratropium (ATROVENT) 0.02 % Solution INHALE 1 VIAL VIA NEBULIZER EVERY 4 HOURS 25 mL 0    levonorgestrel (MIRENA, 52 MG,) 52 mg (20 mcg/24 hr) IUD 1 Each by Intrauterine route one time. Inserted on 4/23/2021      cetirizine (ZYRTEC) 10 MG Tab Take 10 mg by mouth every day.      omeprazole (PRILOSEC) 20 MG delayed-release capsule Take 20 mg by mouth every day.      predniSONE (DELTASONE) 20 MG Tab Take 2 tabs once daily for 4 days (Patient not taking: Reported on 9/23/2023) 8 Tablet 0     No current facility-administered medications on file prior to visit.        Allergies:    Ranitidine     Problem list, medications, and allergies reviewed by myself today in Epic       Physical Exam:  Vitals:    09/23/23 1346   BP: 126/82   Pulse: 99   Resp: 16   Temp: 36.9 °C (98.4 °F)   SpO2: 96%        Physical Exam  Vitals reviewed.   Constitutional:       General: She is not in acute distress.     Appearance: Normal  appearance. She is well-developed. She is not toxic-appearing.   HENT:      Head: Normocephalic and atraumatic.      Right Ear: Tympanic membrane, ear canal and external ear normal.      Left Ear: Ear canal and external ear normal. Tympanic membrane is erythematous (dull) and bulging.      Nose: Congestion and rhinorrhea present.      Right Sinus: Maxillary sinus tenderness and frontal sinus tenderness present.      Left Sinus: Maxillary sinus tenderness and frontal sinus tenderness present.      Mouth/Throat:      Lips: Pink.      Mouth: Mucous membranes are moist.      Pharynx: Posterior oropharyngeal erythema present.   Eyes:      General: Lids are normal. Gaze aligned appropriately. No allergic shiner or scleral icterus.     Extraocular Movements: Extraocular movements intact.      Conjunctiva/sclera: Conjunctivae normal.      Pupils: Pupils are equal, round, and reactive to light.   Cardiovascular:      Rate and Rhythm: Normal rate and regular rhythm.      Pulses:           Radial pulses are 2+ on the right side and 2+ on the left side.      Heart sounds: Normal heart sounds.   Pulmonary:      Effort: Pulmonary effort is normal.      Breath sounds: Normal breath sounds. No wheezing.   Musculoskeletal:      Right lower leg: No edema.      Left lower leg: No edema.   Lymphadenopathy:      Cervical: No cervical adenopathy.   Skin:     General: Skin is warm.      Capillary Refill: Capillary refill takes less than 2 seconds.      Coloration: Skin is not cyanotic or pale.      Findings: No rash.   Neurological:      Mental Status: She is alert.      Gait: Gait is intact.   Psychiatric:         Behavior: Behavior normal. Behavior is cooperative.            Medical Decision Making / Plan :  I personally reviewed prior external notes and test results pertinent to today's visit. Pt is clinically stable at today's acute urgent care visit.  Patient appears nontoxic with no acute distress noted. Appropriate for outpatient  care at this time. The patient remained stable during the urgent care visit.     Pleasant 43 y.o. female  presented clinic with 9 days of sinus congestion pain and pressure that is worsening.  Patient also has left otitis media on exam.  Using shared decision making did send for Augmentin 10-day course.  Did discuss that Afrin causes rebound congestion if used for more than 3 days.  Did send for prednisone as well as Flonase to treat for rhinitis medicamentosa.  Shared decision-making was utilized with patient for treatment plan. Differential Diagnosis, natural history, and supportive care discussed. Did advise patient on conservative measures for management of symptoms.  Patient is agreeable to pursue adequate rest, adequate hydration, saltwater gargle and Neti pot for any symptoms of upper respiratory congestion.  Over-the-counter analgesia and antipyretics on a p.r.n. basis as needed for pain and fever.  Did discuss over-the-counter cough medications.        1. Acute non-recurrent pansinusitis    - amoxicillin-clavulanate (AUGMENTIN) 875-125 MG Tab; Take 1 Tablet by mouth 2 times a day for 10 days.  Dispense: 20 Tablet; Refill: 0    2. Non-recurrent acute suppurative otitis media of left ear without spontaneous rupture of tympanic membrane    - amoxicillin-clavulanate (AUGMENTIN) 875-125 MG Tab; Take 1 Tablet by mouth 2 times a day for 10 days.  Dispense: 20 Tablet; Refill: 0    3. Rhinitis medicamentosa    - predniSONE (DELTASONE) 20 MG Tab; Take 2 Tablets by mouth every day for 5 days.  Dispense: 10 Tablet; Refill: 0  - fluticasone (FLONASE) 50 MCG/ACT nasal spray; Administer 1 Spray into affected nostril(S) every day.  Dispense: 16 g; Refill: 0       Medication discussed included indication for use and the potential benefits and side effects. Education was provided regarding the aforementioned assessments.  All of the patient's questions were answered to their satisfaction at the time of discharge. Patient was  encouraged to monitor symptoms closely. Those signs and symptoms which would warrant concern and mandate seeking a higher level of service through the emergency department discussed at length.  Patient stated agreement and understanding of this plan of care.        Disposition:  Patient was discharged in stable condition.    Voice Recognition Disclaimer: Portions of this document were created using voice recognition software. The software does have a chance of producing errors of grammar and possibly content. I have made every reasonable attempt to correct obvious errors, but there may be errors of grammar and possibly content that I did not discover before finalizing the documentation.    Claudia Tucker, [email or web address].

## 2023-10-02 DIAGNOSIS — J45.20 MILD INTERMITTENT REACTIVE AIRWAY DISEASE WITHOUT COMPLICATION: ICD-10-CM

## 2023-10-04 ENCOUNTER — OFFICE VISIT (OUTPATIENT)
Dept: URGENT CARE | Facility: PHYSICIAN GROUP | Age: 43
End: 2023-10-04
Payer: COMMERCIAL

## 2023-10-04 VITALS
OXYGEN SATURATION: 97 % | SYSTOLIC BLOOD PRESSURE: 112 MMHG | DIASTOLIC BLOOD PRESSURE: 78 MMHG | BODY MASS INDEX: 29.23 KG/M2 | HEIGHT: 63 IN | RESPIRATION RATE: 16 BRPM | TEMPERATURE: 98.6 F | HEART RATE: 97 BPM | WEIGHT: 165 LBS

## 2023-10-04 DIAGNOSIS — H92.02 LEFT EAR PAIN: ICD-10-CM

## 2023-10-04 DIAGNOSIS — H69.92 EUSTACHIAN TUBE DYSFUNCTION, LEFT: ICD-10-CM

## 2023-10-04 PROCEDURE — 99213 OFFICE O/P EST LOW 20 MIN: CPT | Performed by: NURSE PRACTITIONER

## 2023-10-04 PROCEDURE — 3078F DIAST BP <80 MM HG: CPT | Performed by: NURSE PRACTITIONER

## 2023-10-04 PROCEDURE — 3074F SYST BP LT 130 MM HG: CPT | Performed by: NURSE PRACTITIONER

## 2023-10-04 ASSESSMENT — ENCOUNTER SYMPTOMS
EYE DISCHARGE: 0
WHEEZING: 0
NECK PAIN: 0
SORE THROAT: 0
MYALGIAS: 0
CHILLS: 1
HEADACHES: 0
COUGH: 0
SHORTNESS OF BREATH: 0
DIZZINESS: 0
EYE REDNESS: 0
WEAKNESS: 0
FEVER: 0

## 2023-10-04 NOTE — PROGRESS NOTES
Subjective     Tiffany Milligan is a 43 y.o. female who presents with Otalgia (Pt was seen recently for sinusitis and ear pain. Left ear pain persists after completion of abx. )            Otalgia   Pertinent negatives include no coughing, headaches, neck pain, rash or sore throat.    has been experiencing acute left ear pain.   was experiencing ear pain with her sinus congestion when seen in urgent care approximately 10 days ago.   has finished her antibiotics for sinus infection.   has not been using her Flonase recently but takes Zyrtec daily.  Denies sore throat or facial pressure.    PMH:  has a past medical history of Asthma, Colitis, Gout, Heart burn, History of cholecystectomy (1/9/2013), and Proctitis (12/23/2016).  MEDS:   Current Outpatient Medications:     fluticasone (FLONASE) 50 MCG/ACT nasal spray, Administer 1 Spray into affected nostril(S) every day., Disp: 16 g, Rfl: 0    albuterol 108 (90 Base) MCG/ACT Aero Soln inhalation aerosol, INHALE 1-2 PUFFS BY MOUTH EVERY 6 HOURS AS NEEDED FOR SHORTNESS OF BREATH., Disp: 6.7 Each, Rfl: 0    ipratropium (ATROVENT) 0.02 % Solution, INHALE 1 VIAL VIA NEBULIZER EVERY 4 HOURS, Disp: 25 mL, Rfl: 0    levonorgestrel (MIRENA, 52 MG,) 52 mg (20 mcg/24 hr) IUD, 1 Each by Intrauterine route one time. Inserted on 4/23/2021, Disp: , Rfl:     cetirizine (ZYRTEC) 10 MG Tab, Take 10 mg by mouth every day., Disp: , Rfl:     omeprazole (PRILOSEC) 20 MG delayed-release capsule, Take 20 mg by mouth every day., Disp: , Rfl:     predniSONE (DELTASONE) 20 MG Tab, Take 2 tabs once daily for 4 days (Patient not taking: Reported on 9/23/2023), Disp: 8 Tablet, Rfl: 0  ALLERGIES:   Allergies   Allergen Reactions    Ranitidine      2003-04-21;abd pain     SURGHX:   Past Surgical History:   Procedure Laterality Date    CHOLECYSTECTOMY  2009     SOCHX:  reports that she has never smoked. She has never used smokeless tobacco. She reports current alcohol use of  "about 0.6 oz of alcohol per week. She reports that she does not use drugs.  FH: Family history was reviewed, no pertinent findings to report      Review of Systems   Constitutional:  Positive for chills. Negative for fever and malaise/fatigue.   HENT:  Positive for ear pain. Negative for congestion and sore throat.    Eyes:  Negative for discharge and redness.   Respiratory:  Negative for cough, shortness of breath and wheezing.    Musculoskeletal:  Negative for myalgias and neck pain.   Skin:  Negative for itching and rash.   Neurological:  Negative for dizziness, weakness and headaches.   Endo/Heme/Allergies:  Negative for environmental allergies.   All other systems reviewed and are negative.             Objective     /78 (BP Location: Left arm, Patient Position: Sitting, BP Cuff Size: Adult long)   Pulse 97   Temp 37 °C (98.6 °F) (Temporal)   Resp 16   Ht 1.6 m (5' 3\")   Wt 74.8 kg (165 lb)   SpO2 97%   BMI 29.23 kg/m²      Physical Exam  Vitals reviewed.   Constitutional:       General: She is awake. She is not in acute distress.     Appearance: Normal appearance. She is well-developed. She is not ill-appearing, toxic-appearing or diaphoretic.   HENT:      Head: Normocephalic.      Right Ear: Ear canal and external ear normal. A middle ear effusion is present.      Left Ear: Ear canal and external ear normal. A middle ear effusion is present.      Nose: Nose normal.   Cardiovascular:      Rate and Rhythm: Normal rate.   Pulmonary:      Effort: Pulmonary effort is normal.   Musculoskeletal:         General: Normal range of motion.      Cervical back: Normal range of motion and neck supple.   Skin:     General: Skin is warm and dry.   Neurological:      Mental Status: She is alert and oriented to person, place, and time.   Psychiatric:         Attention and Perception: Attention normal.         Mood and Affect: Mood normal.         Speech: Speech normal.         Behavior: Behavior normal. Behavior is " cooperative.                             Assessment & Plan        1. Left ear pain      2. Eustachian tube dysfunction, left    -Maintain hydration/water intake  -Get rest  -May use over the counter Ibuprofen/Tylenol as needed for any fever, body aches or ear/sinus/throat pain  -May take longer acting antihistamine for nasal congestion/runny nose/post nasal drip symptoms (chose one like Claritin/Zyrtec/Allegra without decongestant) x 1 week then as needed   -May use over the counter saline nasal spray for nasal congestion/post nasal drip up to 4x/day  -May use over the counter Flonase or Nasocort for sinus nasal congestion/ear pressure as needed x 1 week then as needed   -Monitor for worsening or persistent symptoms, increased facial pressure, dizziness, fever, worse cough- need re-evaluation in urgent care

## 2023-12-01 ENCOUNTER — HOSPITAL ENCOUNTER (OUTPATIENT)
Dept: RADIOLOGY | Facility: MEDICAL CENTER | Age: 43
End: 2023-12-01
Attending: FAMILY MEDICINE
Payer: COMMERCIAL

## 2023-12-01 DIAGNOSIS — Z12.31 VISIT FOR SCREENING MAMMOGRAM: ICD-10-CM

## 2024-01-04 ENCOUNTER — PATIENT MESSAGE (OUTPATIENT)
Dept: MEDICAL GROUP | Age: 44
End: 2024-01-04
Payer: COMMERCIAL

## 2024-01-04 DIAGNOSIS — J45.20 MILD INTERMITTENT REACTIVE AIRWAY DISEASE WITHOUT COMPLICATION: ICD-10-CM

## 2024-01-04 DIAGNOSIS — J45.30 REACTIVE AIRWAY DISEASE, MILD PERSISTENT, UNCOMPLICATED: ICD-10-CM

## 2024-01-05 RX ORDER — ALBUTEROL SULFATE 90 UG/1
AEROSOL, METERED RESPIRATORY (INHALATION)
Qty: 6.7 EACH | Refills: 0 | Status: SHIPPED | OUTPATIENT
Start: 2024-01-05 | End: 2024-01-29

## 2024-01-22 ENCOUNTER — OFFICE VISIT (OUTPATIENT)
Dept: MEDICAL GROUP | Facility: MEDICAL CENTER | Age: 44
End: 2024-01-22
Payer: COMMERCIAL

## 2024-01-22 VITALS
BODY MASS INDEX: 29.58 KG/M2 | SYSTOLIC BLOOD PRESSURE: 116 MMHG | TEMPERATURE: 98.4 F | HEART RATE: 87 BPM | OXYGEN SATURATION: 94 % | WEIGHT: 167 LBS | DIASTOLIC BLOOD PRESSURE: 64 MMHG

## 2024-01-22 DIAGNOSIS — E66.3 OVERWEIGHT (BMI 25.0-29.9): ICD-10-CM

## 2024-01-22 DIAGNOSIS — K21.9 GASTROESOPHAGEAL REFLUX DISEASE WITHOUT ESOPHAGITIS: ICD-10-CM

## 2024-01-22 DIAGNOSIS — Z23 NEED FOR VACCINATION: ICD-10-CM

## 2024-01-22 DIAGNOSIS — Z11.59 NEED FOR HEPATITIS C SCREENING TEST: ICD-10-CM

## 2024-01-22 DIAGNOSIS — Z80.3 FAMILY HISTORY OF MALIGNANT NEOPLASM OF BREAST IN FIRST DEGREE RELATIVE: ICD-10-CM

## 2024-01-22 DIAGNOSIS — Z97.5 IUD (INTRAUTERINE DEVICE) IN PLACE: ICD-10-CM

## 2024-01-22 DIAGNOSIS — N63.21 MASS OF UPPER OUTER QUADRANT OF LEFT BREAST: ICD-10-CM

## 2024-01-22 DIAGNOSIS — Z00.00 PE (PHYSICAL EXAM), ANNUAL: Primary | ICD-10-CM

## 2024-01-22 DIAGNOSIS — E55.9 VITAMIN D INSUFFICIENCY: ICD-10-CM

## 2024-01-22 DIAGNOSIS — J45.20 MILD INTERMITTENT REACTIVE AIRWAY DISEASE WITHOUT COMPLICATION: ICD-10-CM

## 2024-01-22 DIAGNOSIS — Z12.31 ENCOUNTER FOR SCREENING MAMMOGRAM FOR BREAST CANCER: ICD-10-CM

## 2024-01-22 PROBLEM — N93.9 ABNORMAL UTERINE BLEEDING: Status: RESOLVED | Noted: 2021-04-19 | Resolved: 2024-01-22

## 2024-01-22 PROCEDURE — 99396 PREV VISIT EST AGE 40-64: CPT | Mod: 25 | Performed by: FAMILY MEDICINE

## 2024-01-22 PROCEDURE — 99214 OFFICE O/P EST MOD 30 MIN: CPT | Mod: 25 | Performed by: FAMILY MEDICINE

## 2024-01-22 PROCEDURE — 90677 PCV20 VACCINE IM: CPT | Performed by: FAMILY MEDICINE

## 2024-01-22 PROCEDURE — 3074F SYST BP LT 130 MM HG: CPT | Performed by: FAMILY MEDICINE

## 2024-01-22 PROCEDURE — 3078F DIAST BP <80 MM HG: CPT | Performed by: FAMILY MEDICINE

## 2024-01-22 PROCEDURE — 90471 IMMUNIZATION ADMIN: CPT | Performed by: FAMILY MEDICINE

## 2024-01-22 ASSESSMENT — PATIENT HEALTH QUESTIONNAIRE - PHQ9: CLINICAL INTERPRETATION OF PHQ2 SCORE: 0

## 2024-01-23 ENCOUNTER — RESEARCH ENCOUNTER (OUTPATIENT)
Dept: RESEARCH | Facility: MEDICAL CENTER | Age: 44
End: 2024-01-23

## 2024-01-23 NOTE — RESEARCH NOTE
Patient has been referred by Yuridia Zelaya M.D. Sent initial referral follow-up message with instructions to locate and sign consent form(s). The following consent form(s) have been pushed to the patient's MyChart: HERMES/RADHA

## 2024-01-24 PROBLEM — E66.3 OVERWEIGHT (BMI 25.0-29.9): Status: ACTIVE | Noted: 2021-04-19

## 2024-01-24 NOTE — PROGRESS NOTES
Subjective:   CC: aPE, breast mass     HPI:     Tiffany Milligan is a 43 y.o. female, established patient of the clinic.     Patient is overall healthy, no major medical or surgical history.   She has chronic mild reactive airway disease, GERD, vitamin d insufficiency. All are controlled.   She complains of recent development soft mass at the upper outer quadrant of the left breast. Positive family history of breast cancer. Denies nipple bleeding, discharge, left-sided axillary/supraclavicular lymphadenopathy. Patient is .   Patient has history of breast cyst aspiration at the area in question a few year ago.   Body mass index is 29.58 kg/m². Her weight has been slightly increased since last office visit.     Current medicines (including changes today)  Current Outpatient Medications   Medication Sig Dispense Refill    albuterol 108 (90 Base) MCG/ACT Aero Soln inhalation aerosol INHALE 1-2 PUFFS BY MOUTH EVERY 6 HOURS AS NEEDED FOR SHORTNESS OF BREATH. 6.7 Each 0    ipratropium (ATROVENT) 0.02 % Solution Take 1 mL by nebulization every 6 hours as needed (SOB). Need appointment for future refills. 50 mL 0    fluticasone (FLONASE) 50 MCG/ACT nasal spray Administer 1 Spray into affected nostril(S) every day. 16 g 0    levonorgestrel (MIRENA, 52 MG,) 52 mg (20 mcg/24 hr) IUD 1 Each by Intrauterine route one time. Inserted on 2021      cetirizine (ZYRTEC) 10 MG Tab Take 10 mg by mouth every day.      omeprazole (PRILOSEC) 20 MG delayed-release capsule Take 20 mg by mouth every day.       No current facility-administered medications for this visit.     She  has a past medical history of Asthma, Colitis, Gout, Heart burn, History of cholecystectomy (2013), and Proctitis (2016).    I reviewed patient's problem list, allergies, medications, family hx, social hx with patient and update EPIC.        Objective:     /64 (BP Location: Right arm, Patient Position: Sitting, BP Cuff Size: Adult)   Pulse 87    Temp 36.9 °C (98.4 °F) (Temporal)   Wt 75.8 kg (167 lb)   SpO2 94%  Body mass index is 29.58 kg/m².    Physical Exam:  Constitutional: awake, alert, in no distress.  Skin: Warm, dry, good turgor, no rashes, bruises, ulcers in visible areas.  Eye: conjunctiva clear, lids neg for edema or lesions.  Neck: Trachea midline, no masses, no thyromegaly. No cervical or supraclavicular lymphadenopathy  Respiratory: Unlabored respiratory effort, lungs clear to auscultation, no wheezes, no rales.  Cardiovascular: Normal S1, S2, no murmur, no pedal edema.  Psych: Oriented x3, affect and mood wnl, intact judgement and insight.   Breast exam:   Physical Exam  Chest:      Chest wall: No mass.   Breasts:     Right: Normal.      Left: Mass present. No swelling, bleeding, inverted nipple, nipple discharge, skin change or tenderness.      Comments: Soft, mobile, cystic mass noted at the left upper outer quadrant.   Lymphadenopathy:      Upper Body:      Right upper body: No supraclavicular, axillary or pectoral adenopathy.      Left upper body: No supraclavicular, axillary or pectoral adenopathy.           Assessment and Plan:   The following treatment plan was discussed    1. Encounter for screening mammogram for breast cancer  - MA-DIAGNOSTIC MAMMO BILAT W/TOMOSYNTHESIS W/CAD; Future    2. Vitamin D insufficiency  - VITAMIN D,25 HYDROXY (DEFICIENCY); Future    3. Mild intermittent reactive airway disease without complication  Chronic, controlled with Albuterol PRN, no s/e reported, will continue.      4. Gastroesophageal reflux disease without esophagitis  Chronic, controlled with Omeprazole 20 mg qd, no s/e reported, will continue.      5. IUD (intrauterine device) in place  , sexually active, has IUD for contraception.   She has intermittent tolerable spotting.   Will continue to monitor.     6. Need for hepatitis C screening test  - HEP C VIRUS ANTIBODY; Future    7. Need for vaccination  - Pneumococcal Conjugate Vaccine  20-Valent    8. PE (physical exam), annual  Labs per orders  Immunization reviewed and discussed.  Patient was counseled about  diet, supplements, exercises.   Preventive cares reviewed, discussed, and updated as appropriate.     - CBC WITH DIFFERENTIAL; Future  - Comp Metabolic Panel; Future  - HEMOGLOBIN A1C; Future  - Lipid Profile; Future    9. Mass of upper outer quadrant of left breast  - MA-DIAGNOSTIC MAMMO BILAT W/TOMOSYNTHESIS W/CAD; Future  - US-BREAST LIMITED-LEFT; Future    10. Family history of malignant neoplasm of breast in first degree relative  - Referral to Genetic Research Studies    11. Overweight   Body mass index is 29.58 kg/m².   - Patient identified as having weight management issue.  Appropriate orders and counseling given.      Yuridia Zelaya M.D.      Followup: Return in about 3 months (around 4/22/2024) for Pap.    Please note that this dictation was created using voice recognition software. I have made every reasonable attempt to correct obvious errors, but I expect that there are errors of grammar and possibly content that I did not discover before finalizing the note.

## 2024-01-29 DIAGNOSIS — J45.30 REACTIVE AIRWAY DISEASE, MILD PERSISTENT, UNCOMPLICATED: ICD-10-CM

## 2024-01-29 RX ORDER — ALBUTEROL SULFATE 90 UG/1
AEROSOL, METERED RESPIRATORY (INHALATION)
Qty: 6.7 EACH | Refills: 0 | Status: SHIPPED | OUTPATIENT
Start: 2024-01-29

## 2024-02-03 DIAGNOSIS — J45.20 MILD INTERMITTENT REACTIVE AIRWAY DISEASE WITHOUT COMPLICATION: ICD-10-CM

## 2024-03-04 NOTE — RESEARCH NOTE
Unable to connect with patient after multiple contact attempts regarding their referral. Patient has been notified and provided direct contact information if they decide to complete enrollment.. Referral has been closed.

## 2024-03-07 ENCOUNTER — HOSPITAL ENCOUNTER (OUTPATIENT)
Dept: RADIOLOGY | Facility: MEDICAL CENTER | Age: 44
End: 2024-03-07
Attending: FAMILY MEDICINE
Payer: COMMERCIAL

## 2024-03-07 DIAGNOSIS — Z12.31 ENCOUNTER FOR SCREENING MAMMOGRAM FOR BREAST CANCER: ICD-10-CM

## 2024-03-07 DIAGNOSIS — N63.21 MASS OF UPPER OUTER QUADRANT OF LEFT BREAST: ICD-10-CM

## 2024-03-07 PROCEDURE — G0279 TOMOSYNTHESIS, MAMMO: HCPCS

## 2024-03-07 PROCEDURE — 76642 ULTRASOUND BREAST LIMITED: CPT | Mod: RT

## 2024-03-08 PROBLEM — N60.09 BENIGN BREAST CYST IN FEMALE: Status: ACTIVE | Noted: 2024-03-08

## 2024-05-06 ENCOUNTER — APPOINTMENT (OUTPATIENT)
Dept: MEDICAL GROUP | Facility: MEDICAL CENTER | Age: 44
End: 2024-05-06
Payer: COMMERCIAL

## 2024-10-24 ENCOUNTER — OFFICE VISIT (OUTPATIENT)
Dept: URGENT CARE | Facility: PHYSICIAN GROUP | Age: 44
End: 2024-10-24
Payer: COMMERCIAL

## 2024-10-24 ENCOUNTER — HOSPITAL ENCOUNTER (OUTPATIENT)
Dept: RADIOLOGY | Facility: MEDICAL CENTER | Age: 44
End: 2024-10-24
Payer: COMMERCIAL

## 2024-10-24 VITALS
HEIGHT: 63 IN | BODY MASS INDEX: 30.64 KG/M2 | SYSTOLIC BLOOD PRESSURE: 136 MMHG | OXYGEN SATURATION: 99 % | HEART RATE: 84 BPM | DIASTOLIC BLOOD PRESSURE: 88 MMHG | RESPIRATION RATE: 14 BRPM | TEMPERATURE: 98 F | WEIGHT: 172.95 LBS

## 2024-10-24 DIAGNOSIS — S50.02XA CONTUSION OF LEFT ELBOW, INITIAL ENCOUNTER: ICD-10-CM

## 2024-10-24 PROCEDURE — 73080 X-RAY EXAM OF ELBOW: CPT | Mod: LT

## 2024-10-24 PROCEDURE — 99213 OFFICE O/P EST LOW 20 MIN: CPT

## 2024-10-24 PROCEDURE — 3075F SYST BP GE 130 - 139MM HG: CPT

## 2024-10-24 PROCEDURE — 3079F DIAST BP 80-89 MM HG: CPT

## 2024-10-24 ASSESSMENT — ENCOUNTER SYMPTOMS
TINGLING: 1
FALLS: 0
FEVER: 0

## 2025-08-11 ENCOUNTER — HOSPITAL ENCOUNTER (EMERGENCY)
Facility: MEDICAL CENTER | Age: 45
End: 2025-08-11
Attending: EMERGENCY MEDICINE
Payer: COMMERCIAL

## 2025-08-11 ENCOUNTER — APPOINTMENT (OUTPATIENT)
Dept: RADIOLOGY | Facility: MEDICAL CENTER | Age: 45
End: 2025-08-11
Payer: COMMERCIAL

## 2025-08-11 ENCOUNTER — OFFICE VISIT (OUTPATIENT)
Dept: URGENT CARE | Facility: PHYSICIAN GROUP | Age: 45
End: 2025-08-11
Payer: COMMERCIAL

## 2025-08-11 VITALS
HEIGHT: 63 IN | RESPIRATION RATE: 12 BRPM | SYSTOLIC BLOOD PRESSURE: 134 MMHG | HEART RATE: 79 BPM | OXYGEN SATURATION: 99 % | TEMPERATURE: 97.6 F | DIASTOLIC BLOOD PRESSURE: 68 MMHG | BODY MASS INDEX: 31.54 KG/M2 | WEIGHT: 178 LBS

## 2025-08-11 VITALS
BODY MASS INDEX: 31.6 KG/M2 | TEMPERATURE: 98.4 F | DIASTOLIC BLOOD PRESSURE: 88 MMHG | SYSTOLIC BLOOD PRESSURE: 125 MMHG | HEIGHT: 63 IN | WEIGHT: 178.35 LBS | RESPIRATION RATE: 16 BRPM | OXYGEN SATURATION: 98 % | HEART RATE: 65 BPM

## 2025-08-11 DIAGNOSIS — R07.9 ACUTE CHEST PAIN: Primary | ICD-10-CM

## 2025-08-11 DIAGNOSIS — R07.89 CHEST TIGHTNESS: Primary | ICD-10-CM

## 2025-08-11 LAB
ALBUMIN SERPL BCP-MCNC: 4.2 G/DL (ref 3.2–4.9)
ALBUMIN/GLOB SERPL: 1.4 G/DL
ALP SERPL-CCNC: 86 U/L (ref 30–99)
ALT SERPL-CCNC: 27 U/L (ref 2–50)
ANION GAP SERPL CALC-SCNC: 12 MMOL/L (ref 7–16)
AST SERPL-CCNC: 20 U/L (ref 12–45)
BASOPHILS # BLD AUTO: 0.6 % (ref 0–1.8)
BASOPHILS # BLD: 0.05 K/UL (ref 0–0.12)
BILIRUB SERPL-MCNC: 0.4 MG/DL (ref 0.1–1.5)
BUN SERPL-MCNC: 12 MG/DL (ref 8–22)
CALCIUM ALBUM COR SERPL-MCNC: 9.1 MG/DL (ref 8.5–10.5)
CALCIUM SERPL-MCNC: 9.3 MG/DL (ref 8.5–10.5)
CHLORIDE SERPL-SCNC: 104 MMOL/L (ref 96–112)
CO2 SERPL-SCNC: 19 MMOL/L (ref 20–33)
CREAT SERPL-MCNC: 0.72 MG/DL (ref 0.5–1.4)
D DIMER PPP IA.FEU-MCNC: 0.36 UG/ML (FEU) (ref 0–0.5)
EKG IMPRESSION: NORMAL
EOSINOPHIL # BLD AUTO: 0.1 K/UL (ref 0–0.51)
EOSINOPHIL NFR BLD: 1.2 % (ref 0–6.9)
ERYTHROCYTE [DISTWIDTH] IN BLOOD BY AUTOMATED COUNT: 43.2 FL (ref 35.9–50)
GFR SERPLBLD CREATININE-BSD FMLA CKD-EPI: 105 ML/MIN/1.73 M 2
GLOBULIN SER CALC-MCNC: 3.1 G/DL (ref 1.9–3.5)
GLUCOSE SERPL-MCNC: 83 MG/DL (ref 65–99)
HCG SERPL QL: NEGATIVE
HCT VFR BLD AUTO: 41.8 % (ref 37–47)
HGB BLD-MCNC: 13.8 G/DL (ref 12–16)
IMM GRANULOCYTES # BLD AUTO: 0.03 K/UL (ref 0–0.11)
IMM GRANULOCYTES NFR BLD AUTO: 0.4 % (ref 0–0.9)
LYMPHOCYTES # BLD AUTO: 2.87 K/UL (ref 1–4.8)
LYMPHOCYTES NFR BLD: 35.4 % (ref 22–41)
MCH RBC QN AUTO: 29.2 PG (ref 27–33)
MCHC RBC AUTO-ENTMCNC: 33 G/DL (ref 32.2–35.5)
MCV RBC AUTO: 88.6 FL (ref 81.4–97.8)
MONOCYTES # BLD AUTO: 0.47 K/UL (ref 0–0.85)
MONOCYTES NFR BLD AUTO: 5.8 % (ref 0–13.4)
NEUTROPHILS # BLD AUTO: 4.58 K/UL (ref 1.82–7.42)
NEUTROPHILS NFR BLD: 56.6 % (ref 44–72)
NRBC # BLD AUTO: 0 K/UL
NRBC BLD-RTO: 0 /100 WBC (ref 0–0.2)
NT-PROBNP SERPL IA-MCNC: 64 PG/ML (ref 0–125)
PLATELET # BLD AUTO: 383 K/UL (ref 164–446)
PMV BLD AUTO: 8.7 FL (ref 9–12.9)
POTASSIUM SERPL-SCNC: 4 MMOL/L (ref 3.6–5.5)
PROT SERPL-MCNC: 7.3 G/DL (ref 6–8.2)
RBC # BLD AUTO: 4.72 M/UL (ref 4.2–5.4)
SODIUM SERPL-SCNC: 135 MMOL/L (ref 135–145)
TROPONIN T SERPL-MCNC: <6 NG/L (ref 6–19)
WBC # BLD AUTO: 8.1 K/UL (ref 4.8–10.8)

## 2025-08-11 PROCEDURE — 85025 COMPLETE CBC W/AUTO DIFF WBC: CPT

## 2025-08-11 PROCEDURE — 99284 EMERGENCY DEPT VISIT MOD MDM: CPT

## 2025-08-11 PROCEDURE — 3075F SYST BP GE 130 - 139MM HG: CPT

## 2025-08-11 PROCEDURE — 80053 COMPREHEN METABOLIC PANEL: CPT

## 2025-08-11 PROCEDURE — 85379 FIBRIN DEGRADATION QUANT: CPT

## 2025-08-11 PROCEDURE — 99214 OFFICE O/P EST MOD 30 MIN: CPT

## 2025-08-11 PROCEDURE — 84703 CHORIONIC GONADOTROPIN ASSAY: CPT

## 2025-08-11 PROCEDURE — 36415 COLL VENOUS BLD VENIPUNCTURE: CPT

## 2025-08-11 PROCEDURE — 71045 X-RAY EXAM CHEST 1 VIEW: CPT

## 2025-08-11 PROCEDURE — 93005 ELECTROCARDIOGRAM TRACING: CPT | Mod: TC | Performed by: EMERGENCY MEDICINE

## 2025-08-11 PROCEDURE — 83880 ASSAY OF NATRIURETIC PEPTIDE: CPT

## 2025-08-11 PROCEDURE — 3078F DIAST BP <80 MM HG: CPT

## 2025-08-11 PROCEDURE — 84484 ASSAY OF TROPONIN QUANT: CPT

## 2025-08-11 PROCEDURE — 93000 ELECTROCARDIOGRAM COMPLETE: CPT

## 2025-08-11 PROCEDURE — 93005 ELECTROCARDIOGRAM TRACING: CPT | Mod: TC

## 2025-08-11 ASSESSMENT — ENCOUNTER SYMPTOMS
CHILLS: 0
HEARTBURN: 1
ABDOMINAL PAIN: 0
VOMITING: 0
DIARRHEA: 0
FEVER: 0
MUSCULOSKELETAL NEGATIVE: 1
DIZZINESS: 1
EYES NEGATIVE: 1
SHORTNESS OF BREATH: 1
DIAPHORESIS: 1
NAUSEA: 0
COUGH: 0

## 2025-08-11 ASSESSMENT — HEART SCORE
HISTORY: SLIGHTLY SUSPICIOUS
RISK FACTORS: 1-2 RISK FACTORS
TROPONIN: LESS THAN OR EQUAL TO NORMAL LIMIT
HEART SCORE: 2
AGE: 45-64
ECG: NORMAL